# Patient Record
Sex: FEMALE | Race: WHITE | HISPANIC OR LATINO | URBAN - METROPOLITAN AREA
[De-identification: names, ages, dates, MRNs, and addresses within clinical notes are randomized per-mention and may not be internally consistent; named-entity substitution may affect disease eponyms.]

---

## 2018-12-30 ENCOUNTER — WALK IN (OUTPATIENT)
Dept: URGENT CARE | Age: 28
End: 2018-12-30

## 2018-12-30 VITALS
BODY MASS INDEX: 31.86 KG/M2 | DIASTOLIC BLOOD PRESSURE: 70 MMHG | RESPIRATION RATE: 16 BRPM | OXYGEN SATURATION: 97 % | TEMPERATURE: 98.3 F | WEIGHT: 168.76 LBS | SYSTOLIC BLOOD PRESSURE: 108 MMHG | HEIGHT: 61 IN | HEART RATE: 79 BPM

## 2018-12-30 DIAGNOSIS — J02.9 SORE THROAT: ICD-10-CM

## 2018-12-30 DIAGNOSIS — J06.9 VIRAL UPPER RESPIRATORY TRACT INFECTION: Primary | ICD-10-CM

## 2018-12-30 LAB
INTERNAL PROCEDURAL CONTROLS ACCEPTABLE: YES
S PYO AG THROAT QL IA.RAPID: NEGATIVE

## 2018-12-30 PROCEDURE — 87880 STREP A ASSAY W/OPTIC: CPT | Performed by: NURSE PRACTITIONER

## 2018-12-30 PROCEDURE — 99203 OFFICE O/P NEW LOW 30 MIN: CPT | Performed by: NURSE PRACTITIONER

## 2018-12-30 RX ORDER — ALBUTEROL SULFATE 90 UG/1
2 AEROSOL, METERED RESPIRATORY (INHALATION) EVERY 4 HOURS PRN
Qty: 1 INHALER | Refills: 0 | Status: SHIPPED | OUTPATIENT
Start: 2018-12-30

## 2018-12-30 RX ORDER — ALBUTEROL SULFATE 90 UG/1
2 AEROSOL, METERED RESPIRATORY (INHALATION) EVERY 4 HOURS PRN
Qty: 1 INHALER | Refills: 0 | Status: SHIPPED | OUTPATIENT
Start: 2018-12-30 | End: 2018-12-30 | Stop reason: SDUPTHER

## 2018-12-30 ASSESSMENT — ENCOUNTER SYMPTOMS
EYES NEGATIVE: 1
FEVER: 0
SHORTNESS OF BREATH: 0
SINUS PAIN: 0
HEADACHES: 0
SWOLLEN GLANDS: 0
SORE THROAT: 1
SINUS PRESSURE: 0
CONSTITUTIONAL NEGATIVE: 1
CHILLS: 0
ALLERGIC/IMMUNOLOGIC NEGATIVE: 1
WHEEZING: 0
COUGH: 1

## 2019-05-31 ENCOUNTER — LAB ENCOUNTER (OUTPATIENT)
Dept: LAB | Facility: HOSPITAL | Age: 29
End: 2019-05-31
Attending: INTERNAL MEDICINE
Payer: COMMERCIAL

## 2019-05-31 DIAGNOSIS — E05.90 HYPERTHYROIDISM: Primary | ICD-10-CM

## 2019-05-31 PROCEDURE — 83520 IMMUNOASSAY QUANT NOS NONAB: CPT

## 2019-05-31 PROCEDURE — 36415 COLL VENOUS BLD VENIPUNCTURE: CPT

## 2019-05-31 PROCEDURE — 86800 THYROGLOBULIN ANTIBODY: CPT

## 2019-05-31 PROCEDURE — 84480 ASSAY TRIIODOTHYRONINE (T3): CPT

## 2019-05-31 PROCEDURE — 84439 ASSAY OF FREE THYROXINE: CPT

## 2019-05-31 PROCEDURE — 84443 ASSAY THYROID STIM HORMONE: CPT

## 2019-05-31 PROCEDURE — 86376 MICROSOMAL ANTIBODY EACH: CPT

## 2019-06-27 ENCOUNTER — TELEPHONE (OUTPATIENT)
Dept: GASTROENTEROLOGY | Age: 29
End: 2019-06-27

## 2019-08-05 ENCOUNTER — OFFICE VISIT (OUTPATIENT)
Dept: SURGERY | Facility: CLINIC | Age: 29
End: 2019-08-05
Payer: COMMERCIAL

## 2019-08-05 DIAGNOSIS — L72.0 INCLUSION CYST: Primary | ICD-10-CM

## 2019-08-05 DIAGNOSIS — D22.30 NEVUS OF FACE: ICD-10-CM

## 2019-08-05 PROCEDURE — 99243 OFF/OP CNSLTJ NEW/EST LOW 30: CPT | Performed by: PLASTIC SURGERY

## 2019-08-05 RX ORDER — ALBUTEROL SULFATE 90 UG/1
2 AEROSOL, METERED RESPIRATORY (INHALATION)
COMMUNITY
Start: 2018-12-30

## 2019-08-05 NOTE — H&P
Mauri Angulo is a 30year old female that presents with Patient presents with:  Lesion: Upper lip and nose and forehead   .     REFERRED BY:  Jewels Damon MD      Pacemaker: No  Latex Allergy: no  Coumadin: No  Plavix: No  Other anticoagulants: No  Ca Aero Soln Inhale 2 puffs into the lungs.  Disp:  Rfl:        Allergies:     Ibuprofen               ANAPHYLAXIS, SWELLING, ITCHING    Comment:Tongue itchy and swollen  Other                   OTHER (SEE COMMENTS)    Comment:Mother has anaphylaxis to sulfa, Past Sunlamp Treatments for Acne: Not Asked        History of tanning: No        Hx of Spending [address] of Time in Sun: No        Bad sunburns in the past: No        Tanning Salons in the Past: No        Hx of Radiation Treatments: No        Regular

## 2019-08-13 ENCOUNTER — APPOINTMENT (OUTPATIENT)
Dept: LAB | Facility: HOSPITAL | Age: 29
End: 2019-08-13
Attending: INTERNAL MEDICINE
Payer: COMMERCIAL

## 2019-08-13 DIAGNOSIS — E05.90 HYPERTHYROIDISM: ICD-10-CM

## 2019-08-13 PROCEDURE — 86376 MICROSOMAL ANTIBODY EACH: CPT

## 2019-08-13 PROCEDURE — 86800 THYROGLOBULIN ANTIBODY: CPT

## 2019-08-13 PROCEDURE — 36415 COLL VENOUS BLD VENIPUNCTURE: CPT

## 2019-08-14 LAB
THYROGLOB SERPL-MCNC: 289 U/ML (ref ?–60)
THYROPEROXIDASE AB SERPL-ACNC: 4175 U/ML (ref ?–60)

## 2019-10-16 ENCOUNTER — LAB ENCOUNTER (OUTPATIENT)
Dept: LAB | Facility: HOSPITAL | Age: 29
End: 2019-10-16
Attending: INTERNAL MEDICINE
Payer: COMMERCIAL

## 2019-10-16 DIAGNOSIS — E05.90 HYPERTHYROIDISM: Primary | ICD-10-CM

## 2019-10-16 DIAGNOSIS — E05.00 GRAVES DISEASE: ICD-10-CM

## 2019-10-16 PROCEDURE — 84480 ASSAY TRIIODOTHYRONINE (T3): CPT

## 2019-10-16 PROCEDURE — 36415 COLL VENOUS BLD VENIPUNCTURE: CPT

## 2019-10-16 PROCEDURE — 84443 ASSAY THYROID STIM HORMONE: CPT

## 2019-10-16 PROCEDURE — 84439 ASSAY OF FREE THYROXINE: CPT

## 2020-02-13 PROBLEM — F98.8 ADD (ATTENTION DEFICIT DISORDER): Status: ACTIVE | Noted: 2020-02-13

## 2020-02-13 PROBLEM — M79.7 FIBROMYALGIA: Status: ACTIVE | Noted: 2020-02-13

## 2020-02-14 PROBLEM — F41.8 POSTPARTUM ANXIETY: Status: ACTIVE | Noted: 2020-02-14

## 2020-02-14 PROBLEM — F41.8 POSTPARTUM ANXIETY (HCC): Status: ACTIVE | Noted: 2020-02-14

## 2020-02-14 PROBLEM — F50.9 DISORDER OF EATING: Status: ACTIVE | Noted: 2020-02-14

## 2020-04-21 PROBLEM — Z34.90 PREGNANCY: Status: ACTIVE | Noted: 2020-04-21

## 2020-04-21 PROBLEM — Z34.90 PREGNANCY (HCC): Status: ACTIVE | Noted: 2020-04-21

## 2020-05-26 ENCOUNTER — ANESTHESIA (OUTPATIENT)
Dept: SURGERY | Facility: HOSPITAL | Age: 30
End: 2020-05-26
Payer: COMMERCIAL

## 2020-05-26 ENCOUNTER — HOSPITAL ENCOUNTER (OUTPATIENT)
Facility: HOSPITAL | Age: 30
Setting detail: HOSPITAL OUTPATIENT SURGERY
Discharge: HOME | End: 2020-05-26
Attending: OBSTETRICS & GYNECOLOGY | Admitting: OBSTETRICS & GYNECOLOGY
Payer: COMMERCIAL

## 2020-05-26 ENCOUNTER — ANESTHESIA EVENT (OUTPATIENT)
Dept: SURGERY | Facility: HOSPITAL | Age: 30
End: 2020-05-26
Payer: COMMERCIAL

## 2020-05-26 VITALS
OXYGEN SATURATION: 98 % | SYSTOLIC BLOOD PRESSURE: 110 MMHG | WEIGHT: 137 LBS | HEIGHT: 61 IN | BODY MASS INDEX: 25.86 KG/M2 | TEMPERATURE: 98 F | HEART RATE: 67 BPM | DIASTOLIC BLOOD PRESSURE: 57 MMHG | RESPIRATION RATE: 17 BRPM

## 2020-05-26 DIAGNOSIS — O02.1 MISSED ABORTION: ICD-10-CM

## 2020-05-26 PROBLEM — Z34.90 PREGNANCY: Status: RESOLVED | Noted: 2020-04-21 | Resolved: 2020-05-26

## 2020-05-26 PROBLEM — Z34.90 PREGNANCY (HCC): Status: RESOLVED | Noted: 2020-04-21 | Resolved: 2020-05-26

## 2020-05-26 PROCEDURE — 86900 BLOOD TYPING SEROLOGIC ABO: CPT | Performed by: OBSTETRICS & GYNECOLOGY

## 2020-05-26 PROCEDURE — 86850 RBC ANTIBODY SCREEN: CPT | Performed by: OBSTETRICS & GYNECOLOGY

## 2020-05-26 PROCEDURE — 88305 TISSUE EXAM BY PATHOLOGIST: CPT | Performed by: OBSTETRICS & GYNECOLOGY

## 2020-05-26 PROCEDURE — 86901 BLOOD TYPING SEROLOGIC RH(D): CPT | Performed by: OBSTETRICS & GYNECOLOGY

## 2020-05-26 PROCEDURE — 10D17ZZ EXTRACTION OF PRODUCTS OF CONCEPTION, RETAINED, VIA NATURAL OR ARTIFICIAL OPENING: ICD-10-PCS | Performed by: OBSTETRICS & GYNECOLOGY

## 2020-05-26 RX ORDER — FAMOTIDINE 20 MG/1
20 TABLET ORAL ONCE
Status: DISCONTINUED | OUTPATIENT
Start: 2020-05-26 | End: 2020-05-26 | Stop reason: HOSPADM

## 2020-05-26 RX ORDER — HYDROCODONE BITARTRATE AND ACETAMINOPHEN 5; 325 MG/1; MG/1
1 TABLET ORAL AS NEEDED
Status: DISCONTINUED | OUTPATIENT
Start: 2020-05-26 | End: 2020-05-26

## 2020-05-26 RX ORDER — MORPHINE SULFATE 10 MG/ML
6 INJECTION, SOLUTION INTRAMUSCULAR; INTRAVENOUS EVERY 10 MIN PRN
Status: DISCONTINUED | OUTPATIENT
Start: 2020-05-26 | End: 2020-05-26

## 2020-05-26 RX ORDER — ONDANSETRON 2 MG/ML
4 INJECTION INTRAMUSCULAR; INTRAVENOUS EVERY 8 HOURS PRN
Status: CANCELLED | OUTPATIENT
Start: 2020-05-26

## 2020-05-26 RX ORDER — HYDROMORPHONE HYDROCHLORIDE 1 MG/ML
0.2 INJECTION, SOLUTION INTRAMUSCULAR; INTRAVENOUS; SUBCUTANEOUS EVERY 5 MIN PRN
Status: DISCONTINUED | OUTPATIENT
Start: 2020-05-26 | End: 2020-05-26

## 2020-05-26 RX ORDER — HYDROCODONE BITARTRATE AND ACETAMINOPHEN 5; 325 MG/1; MG/1
2 TABLET ORAL AS NEEDED
Status: DISCONTINUED | OUTPATIENT
Start: 2020-05-26 | End: 2020-05-26

## 2020-05-26 RX ORDER — HYDROCODONE BITARTRATE AND ACETAMINOPHEN 5; 325 MG/1; MG/1
1 TABLET ORAL EVERY 6 HOURS PRN
Status: CANCELLED | OUTPATIENT
Start: 2020-05-26

## 2020-05-26 RX ORDER — SODIUM CHLORIDE, SODIUM LACTATE, POTASSIUM CHLORIDE, CALCIUM CHLORIDE 600; 310; 30; 20 MG/100ML; MG/100ML; MG/100ML; MG/100ML
INJECTION, SOLUTION INTRAVENOUS CONTINUOUS
Status: DISCONTINUED | OUTPATIENT
Start: 2020-05-26 | End: 2020-05-26

## 2020-05-26 RX ORDER — IBUPROFEN 600 MG/1
600 TABLET ORAL EVERY 6 HOURS
Status: CANCELLED | OUTPATIENT
Start: 2020-05-26

## 2020-05-26 RX ORDER — HYDROMORPHONE HYDROCHLORIDE 1 MG/ML
0.4 INJECTION, SOLUTION INTRAMUSCULAR; INTRAVENOUS; SUBCUTANEOUS EVERY 5 MIN PRN
Status: DISCONTINUED | OUTPATIENT
Start: 2020-05-26 | End: 2020-05-26

## 2020-05-26 RX ORDER — ONDANSETRON 2 MG/ML
4 INJECTION INTRAMUSCULAR; INTRAVENOUS ONCE AS NEEDED
Status: DISCONTINUED | OUTPATIENT
Start: 2020-05-26 | End: 2020-05-26

## 2020-05-26 RX ORDER — HYDROMORPHONE HYDROCHLORIDE 1 MG/ML
0.6 INJECTION, SOLUTION INTRAMUSCULAR; INTRAVENOUS; SUBCUTANEOUS EVERY 5 MIN PRN
Status: DISCONTINUED | OUTPATIENT
Start: 2020-05-26 | End: 2020-05-26

## 2020-05-26 RX ORDER — MISOPROSTOL 100 UG/1
400 TABLET ORAL ONCE
Status: COMPLETED | OUTPATIENT
Start: 2020-05-26 | End: 2020-05-26

## 2020-05-26 RX ORDER — LIDOCAINE HYDROCHLORIDE 10 MG/ML
INJECTION, SOLUTION EPIDURAL; INFILTRATION; INTRACAUDAL; PERINEURAL AS NEEDED
Status: DISCONTINUED | OUTPATIENT
Start: 2020-05-26 | End: 2020-05-26 | Stop reason: SURG

## 2020-05-26 RX ORDER — ACETAMINOPHEN 500 MG
1000 TABLET ORAL ONCE
Status: COMPLETED | OUTPATIENT
Start: 2020-05-26 | End: 2020-05-26

## 2020-05-26 RX ORDER — ACETAMINOPHEN 325 MG/1
650 TABLET ORAL EVERY 6 HOURS PRN
Status: CANCELLED | OUTPATIENT
Start: 2020-05-26

## 2020-05-26 RX ORDER — PROCHLORPERAZINE EDISYLATE 5 MG/ML
5 INJECTION INTRAMUSCULAR; INTRAVENOUS ONCE AS NEEDED
Status: DISCONTINUED | OUTPATIENT
Start: 2020-05-26 | End: 2020-05-26

## 2020-05-26 RX ORDER — DIPHENHYDRAMINE HYDROCHLORIDE 50 MG/ML
12.5 INJECTION INTRAMUSCULAR; INTRAVENOUS EVERY 4 HOURS PRN
Status: CANCELLED | OUTPATIENT
Start: 2020-05-26

## 2020-05-26 RX ORDER — METOCLOPRAMIDE 10 MG/1
10 TABLET ORAL ONCE
Status: DISCONTINUED | OUTPATIENT
Start: 2020-05-26 | End: 2020-05-26 | Stop reason: HOSPADM

## 2020-05-26 RX ORDER — ONDANSETRON 4 MG/1
4 TABLET, FILM COATED ORAL EVERY 8 HOURS PRN
Status: CANCELLED | OUTPATIENT
Start: 2020-05-26

## 2020-05-26 RX ORDER — MORPHINE SULFATE 4 MG/ML
2 INJECTION, SOLUTION INTRAMUSCULAR; INTRAVENOUS EVERY 10 MIN PRN
Status: DISCONTINUED | OUTPATIENT
Start: 2020-05-26 | End: 2020-05-26

## 2020-05-26 RX ORDER — MORPHINE SULFATE 4 MG/ML
4 INJECTION, SOLUTION INTRAMUSCULAR; INTRAVENOUS EVERY 10 MIN PRN
Status: DISCONTINUED | OUTPATIENT
Start: 2020-05-26 | End: 2020-05-26

## 2020-05-26 RX ORDER — NALOXONE HYDROCHLORIDE 0.4 MG/ML
80 INJECTION, SOLUTION INTRAMUSCULAR; INTRAVENOUS; SUBCUTANEOUS AS NEEDED
Status: DISCONTINUED | OUTPATIENT
Start: 2020-05-26 | End: 2020-05-26

## 2020-05-26 RX ORDER — DOXYCYCLINE HYCLATE 100 MG/1
100 CAPSULE ORAL ONCE
Status: COMPLETED | OUTPATIENT
Start: 2020-05-26 | End: 2020-05-26

## 2020-05-26 RX ORDER — HYDROCODONE BITARTRATE AND ACETAMINOPHEN 5; 325 MG/1; MG/1
2 TABLET ORAL EVERY 6 HOURS PRN
Status: CANCELLED | OUTPATIENT
Start: 2020-05-26

## 2020-05-26 RX ADMIN — SODIUM CHLORIDE, SODIUM LACTATE, POTASSIUM CHLORIDE, CALCIUM CHLORIDE: 600; 310; 30; 20 INJECTION, SOLUTION INTRAVENOUS at 12:03:00

## 2020-05-26 RX ADMIN — LIDOCAINE HYDROCHLORIDE 50 MG: 10 INJECTION, SOLUTION EPIDURAL; INFILTRATION; INTRACAUDAL; PERINEURAL at 11:46:00

## 2020-05-26 NOTE — H&P
[address] is a 26year old female. No LMP recorded. Patient is pregnant.   HPI:    Patient here for spotting early in pregnancy, had ultrasound at other clinic showing IUP with FHT repea itching  MUSCULOSKELETAL: denies back pain  NEURO: denies headaches, denies extremity weakness  PSYCHE: denies depression denies anxiety  HEMATOLOGIC: denies hx of anemia, denies easy bruising or bleeding  ENDOCRINE: denies excessive thirst or urination

## 2020-05-26 NOTE — ANESTHESIA PREPROCEDURE EVALUATION
Anesthesia PreOp Note    HPI:     Kalin Ortiz is a 26year old female who presents for preoperative consultation requested by: Michel Floyd MD    Date of Surgery: 5/26/2020    Procedure(s):  DILATION & CURETTAGE SUCTION  Indication: Missed ab 05/26/20 0913  famoTIDine (PEPCID) tab 20 mg, 20 mg, Oral, Once, Adeli, Welby Gowers, MD  Metoclopramide HCl (REGLAN) tab 10 mg, 10 mg, Oral, Once, Adeli, Welby Gowers, MD    No current [facility]-ordered outpatient medications on file.         Other                   OT the sun: No        Past Sunlamp Treatments for Acne: Not Asked        History of tanning: No        Hx of Spending [address] of Time in Sun: No        Bad sunburns in the past: No        Tanning Salons in the Past: No        Hx of Radiation Treatments: No

## 2020-05-26 NOTE — OPERATIVE REPORT
[facility] HOSP - [facility]     Operative Note    Melissa Zuluaga Patient Status:  Hospital Outpatient Surgery    1990 MRN [id number]   Location [place] [address] Attending Ross Ortega MD   Hosp Day # 0 JUSTIN [place]

## 2020-05-26 NOTE — ANESTHESIA POSTPROCEDURE EVALUATION
Patient: Chelita Anderson    Procedure Summary     Date:  20 Room / Location:  [facility] MAIN OR [address] [place] [address] MAIN OR    Anesthesia Start:  25 Anesthesia Stop:      Procedure:  DILATION & CURETTAGE SUCTION (N/A Vagina ) Diagnosis:       Missed

## 2020-05-26 NOTE — DISCHARGE SUMMARY
[facility]    Discharge Summary    Dede He Patient Status:  Hospital Outpatient Surgery    1990 MRN [id number]   Location [place] [address] Attending Randy Granger MD   Hosp Day # 0 [facility]

## 2020-05-26 NOTE — ANESTHESIA PROCEDURE NOTES
Airway  Date/Time: 5/26/2020 11:32 AM  Urgency: Elective      General Information and Staff    Patient location during procedure: OR  Anesthesiologist: Therese Downs MD  Performed: anesthesiologist     Indications and Patient Condition  Indications for

## 2020-05-29 PROBLEM — Z87.59 MISCARRIAGE WITHIN LAST 12 MONTHS: Status: ACTIVE | Noted: 2020-05-29

## 2020-06-09 ENCOUNTER — TELEPHONE (OUTPATIENT)
Dept: OBGYN UNIT | Facility: HOSPITAL | Age: 30
End: 2020-06-09

## 2020-06-12 PROBLEM — Z63.4 GRIEF AT LOSS OF CHILD: Status: ACTIVE | Noted: 2020-06-12

## 2020-06-12 PROBLEM — F43.21 GRIEF AT LOSS OF CHILD: Status: ACTIVE | Noted: 2020-06-12

## 2020-06-12 PROBLEM — N64.4 NIPPLE PAIN: Status: ACTIVE | Noted: 2020-06-12

## 2020-07-14 PROBLEM — R63.2 BINGE EATING: Status: ACTIVE | Noted: 2020-07-14

## 2020-07-21 ENCOUNTER — OFFICE VISIT (OUTPATIENT)
Dept: NEUROLOGY | Facility: CLINIC | Age: 30
End: 2020-07-21
Payer: COMMERCIAL

## 2020-07-21 VITALS — HEIGHT: 61 IN | BODY MASS INDEX: 25.68 KG/M2 | WEIGHT: 136 LBS

## 2020-07-21 DIAGNOSIS — M25.562 CHRONIC PAIN OF LEFT KNEE: ICD-10-CM

## 2020-07-21 DIAGNOSIS — M79.672 LEFT FOOT PAIN: ICD-10-CM

## 2020-07-21 DIAGNOSIS — M22.2X9 PATELLOFEMORAL PAIN SYNDROME, UNSPECIFIED LATERALITY: Primary | ICD-10-CM

## 2020-07-21 DIAGNOSIS — M79.10 MYALGIA: ICD-10-CM

## 2020-07-21 DIAGNOSIS — M62.89 PELVIC FLOOR DYSFUNCTION IN FEMALE: ICD-10-CM

## 2020-07-21 DIAGNOSIS — G89.29 CHRONIC PAIN OF LEFT KNEE: ICD-10-CM

## 2020-07-21 DIAGNOSIS — M54.6 ACUTE MIDLINE THORACIC BACK PAIN: ICD-10-CM

## 2020-07-21 DIAGNOSIS — M77.42 METATARSALGIA OF LEFT FOOT: ICD-10-CM

## 2020-07-21 DIAGNOSIS — N39.3 STRESS INCONTINENCE: ICD-10-CM

## 2020-07-21 DIAGNOSIS — S23.9XXA THORACIC SPRAIN: ICD-10-CM

## 2020-07-21 PROCEDURE — 3008F BODY MASS INDEX DOCD: CPT | Performed by: PHYSICAL MEDICINE & REHABILITATION

## 2020-07-21 PROCEDURE — 99204 OFFICE O/P NEW MOD 45 MIN: CPT | Performed by: PHYSICAL MEDICINE & REHABILITATION

## 2020-07-21 NOTE — H&P
[address] H&P    Requesting Physician: Terell López DO    Chief Complaint (Reason for Visit):  Patient presents with:  Knee Pain: Patient presents today c/o of pain in left knee that has been goi Post partum depression        PAST SURGICAL HISTORY:   Past Surgical History:   Procedure Laterality Date   • DILATION & CURETTAGE SUCTION N/A 5/26/2020    Performed by Rj Velez MD at [facility] MAIN OR        FAMILY HISTORY:   Family History   Problem Re (Approximate)   BMI 25.70 kg/m²     Body mass index is 25.7 kg/m². General: No immediate distress  Head: Normocephalic/ Atraumatic  Eyes: Extra-occular movements intact.    Ears: No auricular hematoma or deformities  Mouth: No lesions or ulcerations  H 107 mmol/L Final   • Carbon Dioxide 06/23/2020 23.0  22.0 - 29.0 mmol/L Final   • Calcium 06/23/2020 9.1  8.6 - 10.0 mg/dL Final   • Total Protein 06/23/2020 7.0  6.4 - 8.3 g/dL Final   • Albumin 06/23/2020 4.4  3.5 - 5.2 g/dL Final   • Bilirubin, Total 06 Imaging:    No image results found. ASSESSMENT AND PLAN:  The patient is a pleasant 26year old female who presents with LEFT knee pain and mid back pain.   For her left knee pain I believe she has patellofemoral syndrome although meniscal injury is al

## 2020-07-21 NOTE — PATIENT INSTRUCTIONS
1) Tylenol 500-1000 mg every 6-8 hours as needed for pain. No more than 3000 mg daily. 2) Start forma ltherapy for the back and knees. 3) continue Sertraline    4) Continue with pelvic floor PT    5) Follow up with me in 6-8 weeks.  If no improvemen

## 2020-07-22 ENCOUNTER — TELEPHONE (OUTPATIENT)
Dept: NEUROLOGY | Facility: CLINIC | Age: 30
End: 2020-07-22

## 2020-07-27 ENCOUNTER — MED REC SCAN ONLY (OUTPATIENT)
Dept: NEUROLOGY | Facility: CLINIC | Age: 30
End: 2020-07-27

## 2020-07-28 PROBLEM — N64.4 NIPPLE PAIN: Status: RESOLVED | Noted: 2020-06-12 | Resolved: 2020-07-28

## 2020-08-13 PROBLEM — F41.1 GENERALIZED ANXIETY DISORDER: Status: ACTIVE | Noted: 2020-08-13

## 2020-08-13 PROBLEM — F33.1 MAJOR DEPRESSIVE DISORDER, RECURRENT, MODERATE (HCC): Status: ACTIVE | Noted: 2020-08-13

## 2020-09-25 ENCOUNTER — LAB ENCOUNTER (OUTPATIENT)
Dept: LAB | Facility: HOSPITAL | Age: 30
End: 2020-09-25
Attending: INTERNAL MEDICINE
Payer: COMMERCIAL

## 2020-09-25 DIAGNOSIS — E05.90 HYPERTHYROIDISM: ICD-10-CM

## 2020-09-25 DIAGNOSIS — E05.00 GRAVES DISEASE: Primary | ICD-10-CM

## 2020-09-25 LAB
T3 SERPL-MCNC: 73 NG/DL (ref 60–181)
T4 FREE SERPL-MCNC: 1 NG/DL (ref 0.8–1.7)
THYROGLOB SERPL-MCNC: 170 U/ML (ref ?–60)
THYROPEROXIDASE AB SERPL-ACNC: 561 U/ML (ref ?–60)
TSI SER-ACNC: 0.59 MIU/ML (ref 0.36–3.74)

## 2020-09-25 PROCEDURE — 84480 ASSAY TRIIODOTHYRONINE (T3): CPT

## 2020-09-25 PROCEDURE — 36415 COLL VENOUS BLD VENIPUNCTURE: CPT

## 2020-09-25 PROCEDURE — 84439 ASSAY OF FREE THYROXINE: CPT

## 2020-09-25 PROCEDURE — 86376 MICROSOMAL ANTIBODY EACH: CPT

## 2020-09-25 PROCEDURE — 84443 ASSAY THYROID STIM HORMONE: CPT

## 2020-09-25 PROCEDURE — 86800 THYROGLOBULIN ANTIBODY: CPT

## 2020-11-03 ENCOUNTER — TELEPHONE (OUTPATIENT)
Dept: NEUROLOGY | Facility: CLINIC | Age: 30
End: 2020-11-03

## 2020-11-03 ENCOUNTER — OFFICE VISIT (OUTPATIENT)
Dept: NEUROLOGY | Facility: CLINIC | Age: 30
End: 2020-11-03

## 2020-11-03 DIAGNOSIS — M79.10 MYALGIA: ICD-10-CM

## 2020-11-03 DIAGNOSIS — M54.6 ACUTE MIDLINE THORACIC BACK PAIN: ICD-10-CM

## 2020-11-03 DIAGNOSIS — M77.42 METATARSALGIA OF LEFT FOOT: ICD-10-CM

## 2020-11-03 DIAGNOSIS — M22.2X9 PATELLOFEMORAL PAIN SYNDROME, UNSPECIFIED LATERALITY: Primary | ICD-10-CM

## 2020-11-03 DIAGNOSIS — M62.89 PELVIC FLOOR DYSFUNCTION IN FEMALE: ICD-10-CM

## 2020-11-03 DIAGNOSIS — M25.562 CHRONIC PAIN OF LEFT KNEE: ICD-10-CM

## 2020-11-03 DIAGNOSIS — G89.29 CHRONIC PAIN OF LEFT KNEE: ICD-10-CM

## 2020-11-03 DIAGNOSIS — M79.672 LEFT FOOT PAIN: ICD-10-CM

## 2020-11-03 DIAGNOSIS — N39.3 STRESS INCONTINENCE: ICD-10-CM

## 2020-11-03 DIAGNOSIS — S23.9XXA THORACIC SPRAIN: ICD-10-CM

## 2020-11-03 PROCEDURE — 99214 OFFICE O/P EST MOD 30 MIN: CPT | Performed by: PHYSICAL MEDICINE & REHABILITATION

## 2020-11-03 PROCEDURE — 99072 ADDL SUPL MATRL&STAF TM PHE: CPT | Performed by: PHYSICAL MEDICINE & REHABILITATION

## 2020-11-03 NOTE — TELEPHONE ENCOUNTER
[employer] for authorization of approval for MRI KNEE, LEFT wo cpt code 68931 Approval was given with Authorization # [id number] effective 11/03/20 to 12/02/20. Will call Pt. To inform. L/m advising of approval. Can proceed with scheduling appt.

## 2020-11-20 ENCOUNTER — HOSPITAL ENCOUNTER (OUTPATIENT)
Dept: MRI IMAGING | Age: 30
Discharge: HOME | End: 2020-11-20
Attending: PHYSICAL MEDICINE & REHABILITATION
Payer: COMMERCIAL

## 2020-11-20 DIAGNOSIS — M79.10 MYALGIA: ICD-10-CM

## 2020-11-20 DIAGNOSIS — M22.2X9 PATELLOFEMORAL PAIN SYNDROME, UNSPECIFIED LATERALITY: ICD-10-CM

## 2020-11-20 DIAGNOSIS — M79.672 LEFT FOOT PAIN: ICD-10-CM

## 2020-11-20 DIAGNOSIS — G89.29 CHRONIC PAIN OF LEFT KNEE: ICD-10-CM

## 2020-11-20 DIAGNOSIS — M54.6 ACUTE MIDLINE THORACIC BACK PAIN: ICD-10-CM

## 2020-11-20 DIAGNOSIS — S23.9XXA THORACIC SPRAIN: ICD-10-CM

## 2020-11-20 DIAGNOSIS — M62.89 PELVIC FLOOR DYSFUNCTION IN FEMALE: ICD-10-CM

## 2020-11-20 DIAGNOSIS — M77.42 METATARSALGIA OF LEFT FOOT: ICD-10-CM

## 2020-11-20 DIAGNOSIS — N39.3 STRESS INCONTINENCE: ICD-10-CM

## 2020-11-20 DIAGNOSIS — M25.562 CHRONIC PAIN OF LEFT KNEE: ICD-10-CM

## 2020-11-20 PROCEDURE — 73721 MRI JNT OF LWR EXTRE W/O DYE: CPT | Performed by: PHYSICAL MEDICINE & REHABILITATION

## 2020-11-30 ENCOUNTER — OFFICE VISIT (OUTPATIENT)
Dept: GASTROENTEROLOGY | Facility: CLINIC | Age: 30
End: 2020-11-30
Payer: COMMERCIAL

## 2020-11-30 VITALS
HEIGHT: 61 IN | DIASTOLIC BLOOD PRESSURE: 77 MMHG | BODY MASS INDEX: 27.56 KG/M2 | SYSTOLIC BLOOD PRESSURE: 121 MMHG | HEART RATE: 76 BPM | WEIGHT: 146 LBS

## 2020-11-30 DIAGNOSIS — M62.89 PELVIC FLOOR DYSFUNCTION: Primary | ICD-10-CM

## 2020-11-30 PROCEDURE — 99203 OFFICE O/P NEW LOW 30 MIN: CPT | Performed by: INTERNAL MEDICINE

## 2020-11-30 PROCEDURE — 3074F SYST BP LT 130 MM HG: CPT | Performed by: INTERNAL MEDICINE

## 2020-11-30 PROCEDURE — 3008F BODY MASS INDEX DOCD: CPT | Performed by: INTERNAL MEDICINE

## 2020-11-30 PROCEDURE — 3078F DIAST BP <80 MM HG: CPT | Performed by: INTERNAL MEDICINE

## 2020-12-01 NOTE — PROGRESS NOTES
HPI:    Patient ID: Jw Loredo is a 26year old female. HPI  The patient is self referred on the advice of her pelvic floor therapist to discuss the possibility of rectal symptoms contributing to vaginal symptoms.     The patient has a longsta Minerals (CALCIUM-MAGNESIUM-ZINC) Oral Tab Take by mouth 3 (three) times daily. 0   • Cetirizine HCl 5 MG Oral Tab Take 5 mg by mouth daily. • Omega-3 1000 MG Oral Cap 1 capsule daily. • PreNata 29-1 MG Oral Chew Tab 1 tablet daily.        • Drake encounter diagnosis)  The patient has a longstanding history of passing large stools with resultant anorectal trauma. She is currently being treated for vaginal prolapse.   Her physical examination today shows no sign of significant stool retention in the

## 2020-12-08 ENCOUNTER — TELEPHONE (OUTPATIENT)
Dept: NEUROLOGY | Facility: CLINIC | Age: 30
End: 2020-12-08

## 2020-12-08 ENCOUNTER — OFFICE VISIT (OUTPATIENT)
Dept: NEUROLOGY | Facility: CLINIC | Age: 30
End: 2020-12-08
Payer: COMMERCIAL

## 2020-12-08 VITALS — HEIGHT: 61 IN | BODY MASS INDEX: 27.56 KG/M2 | WEIGHT: 146 LBS

## 2020-12-08 DIAGNOSIS — M23.322 DERANGEMENT OF POSTERIOR HORN OF MEDIAL MENISCUS OF LEFT KNEE: ICD-10-CM

## 2020-12-08 DIAGNOSIS — M17.12 PRIMARY OSTEOARTHRITIS OF LEFT KNEE: Primary | ICD-10-CM

## 2020-12-08 PROCEDURE — 3008F BODY MASS INDEX DOCD: CPT | Performed by: PHYSICAL MEDICINE & REHABILITATION

## 2020-12-08 PROCEDURE — 99214 OFFICE O/P EST MOD 30 MIN: CPT | Performed by: PHYSICAL MEDICINE & REHABILITATION

## 2020-12-08 NOTE — TELEPHONE ENCOUNTER
Alvaro Connolly at [address] [place] [address] Case/Reference #[id number] to initiate authorization for LEFT knee HA(Synvisc One) injections under ultrasound guidance CPT N6976A and 23601 dx:M17.12 to be done in office.   [place] states no authorization is req

## 2020-12-08 NOTE — PROGRESS NOTES
[address]  Progress Note    CHIEF COMPLAINT:  Patient presents with:  Knee Pain: Pt is presnt following up on her MRI she had on her knee       History of Present Illness:   The patient is a 26year old [address] [place] [address] Multiple Minerals (CALCIUM-MAGNESIUM-ZINC) Oral Tab Take by mouth 3 (three) times daily. 0   • clotrimazole-betamethasone 1-0.05 % External Cream To AA TID 45 g 0   • Cetirizine HCl 5 MG Oral Tab Take 5 mg by mouth daily.      • Omega-3 1000 MG Oral Cap 1 1.7 ng/dL Final   • T3 Total 09/25/2020 73  60 - 181 ng/dL Final   • Anti-Thyroperoxidase 09/25/2020 561* <60 U/mL Final   • Anti-Thyroglobulin 09/25/2020 170* <60 U/mL Final   Orders Only on 06/23/2020   Component Date Value Ref Range Status   • Patient F and body of the medial meniscus. There is a radial component seen within the body as well as an oblique component seen within the body and posterior horn. There is medial extrusion of   the body of the medial meniscus by approximately 4-5 mm.      LATERAL Taz Serna is actively trying to get pregnant, we have decided to start with a hyaluronic acid injection and see if there is any improvement in regeneration of the tissue.   If this is not beneficial, then Taz Serna will decide between a PRP injection versus a s

## 2020-12-08 NOTE — PATIENT INSTRUCTIONS
My office will call you to schedule the LEFT knee HA under ultrasound guidance once the procedure is approved by your insurance carrier.

## 2020-12-10 NOTE — TELEPHONE ENCOUNTER
Called patient to inform. Asked patient to call the day prior to her appointment to verify the medication is in the building.     Understanding verbalized

## 2020-12-14 ENCOUNTER — MED REC SCAN ONLY (OUTPATIENT)
Dept: NEUROLOGY | Facility: CLINIC | Age: 30
End: 2020-12-14

## 2020-12-15 ENCOUNTER — TELEPHONE (OUTPATIENT)
Dept: NEUROLOGY | Facility: CLINIC | Age: 30
End: 2020-12-15

## 2020-12-15 DIAGNOSIS — M79.641 BILATERAL HAND PAIN: Primary | ICD-10-CM

## 2020-12-15 DIAGNOSIS — M79.642 BILATERAL HAND PAIN: Primary | ICD-10-CM

## 2020-12-15 NOTE — TELEPHONE ENCOUNTER
Spoke with Ellen Daniels as she was scheduled for a left knee hyaluronic acid injection tomorrow. She found out she was recently pregnant.   I have called the  of Synvisc 1 and they mention there is no concrete evidence to state whether the hyaluroni

## 2020-12-17 ENCOUNTER — LAB ENCOUNTER (OUTPATIENT)
Dept: LAB | Facility: HOSPITAL | Age: 30
End: 2020-12-17
Attending: INTERNAL MEDICINE
Payer: COMMERCIAL

## 2020-12-17 DIAGNOSIS — U07.1 DIARRHEA DUE TO COVID-19: ICD-10-CM

## 2020-12-17 DIAGNOSIS — A08.39 DIARRHEA DUE TO COVID-19: ICD-10-CM

## 2020-12-17 DIAGNOSIS — Z20.822 CLOSE EXPOSURE TO COVID-19 VIRUS: ICD-10-CM

## 2020-12-31 ENCOUNTER — OFFICE VISIT (OUTPATIENT)
Dept: OBGYN CLINIC | Facility: CLINIC | Age: 30
End: 2020-12-31
Payer: COMMERCIAL

## 2020-12-31 ENCOUNTER — TELEPHONE (OUTPATIENT)
Dept: OBGYN CLINIC | Facility: CLINIC | Age: 30
End: 2020-12-31

## 2020-12-31 ENCOUNTER — LAB ENCOUNTER (OUTPATIENT)
Dept: LAB | Facility: HOSPITAL | Age: 30
End: 2020-12-31
Attending: INTERNAL MEDICINE
Payer: COMMERCIAL

## 2020-12-31 VITALS
HEART RATE: 72 BPM | DIASTOLIC BLOOD PRESSURE: 74 MMHG | BODY MASS INDEX: 28.7 KG/M2 | WEIGHT: 152 LBS | HEIGHT: 61 IN | SYSTOLIC BLOOD PRESSURE: 112 MMHG

## 2020-12-31 DIAGNOSIS — E05.90 HYPERTHYROIDISM: Primary | ICD-10-CM

## 2020-12-31 DIAGNOSIS — Z87.59 HISTORY OF MISCARRIAGE: ICD-10-CM

## 2020-12-31 DIAGNOSIS — R42 DIZZINESS: ICD-10-CM

## 2020-12-31 DIAGNOSIS — N92.6 MISSED MENSES: Primary | ICD-10-CM

## 2020-12-31 LAB
ALBUMIN SERPL-MCNC: 3.6 G/DL (ref 3.4–5)
ALBUMIN/GLOB SERPL: 1.2 {RATIO} (ref 1–2)
ALP LIVER SERPL-CCNC: 49 U/L
ALT SERPL-CCNC: 23 U/L
ANION GAP SERPL CALC-SCNC: 6 MMOL/L (ref 0–18)
AST SERPL-CCNC: 21 U/L (ref 15–37)
B-HCG SERPL-ACNC: ABNORMAL MIU/ML
BILIRUB SERPL-MCNC: 0.3 MG/DL (ref 0.1–2)
BUN BLD-MCNC: 13 MG/DL (ref 7–18)
BUN/CREAT SERPL: 20.6 (ref 10–20)
CALCIUM BLD-MCNC: 8.6 MG/DL (ref 8.5–10.1)
CHLORIDE SERPL-SCNC: 107 MMOL/L (ref 98–112)
CO2 SERPL-SCNC: 24 MMOL/L (ref 21–32)
CREAT BLD-MCNC: 0.63 MG/DL
DEPRECATED RDW RBC AUTO: 40.7 FL (ref 35.1–46.3)
ERYTHROCYTE [DISTWIDTH] IN BLOOD BY AUTOMATED COUNT: 12.3 % (ref 11–15)
GLOBULIN PLAS-MCNC: 3.1 G/DL (ref 2.8–4.4)
GLUCOSE BLD-MCNC: 86 MG/DL (ref 70–99)
HCT VFR BLD AUTO: 41 %
HGB BLD-MCNC: 14 G/DL
M PROTEIN MFR SERPL ELPH: 6.7 G/DL (ref 6.4–8.2)
MCH RBC QN AUTO: 30.6 PG (ref 26–34)
MCHC RBC AUTO-ENTMCNC: 34.1 G/DL (ref 31–37)
MCV RBC AUTO: 89.5 FL
OSMOLALITY SERPL CALC.SUM OF ELEC: 283 MOSM/KG (ref 275–295)
PATIENT FASTING Y/N/NP: NO
PLATELET # BLD AUTO: 294 10(3)UL (ref 150–450)
POTASSIUM SERPL-SCNC: 4.3 MMOL/L (ref 3.5–5.1)
PROGEST SERPL-MCNC: 25 NG/ML
RBC # BLD AUTO: 4.58 X10(6)UL
SODIUM SERPL-SCNC: 137 MMOL/L (ref 136–145)
T3FREE SERPL-MCNC: 2.53 PG/ML (ref 2.4–4.2)
T4 FREE SERPL-MCNC: 1 NG/DL (ref 0.8–1.7)
TSI SER-ACNC: 0.72 MIU/ML (ref 0.36–3.74)
WBC # BLD AUTO: 10.7 X10(3) UL (ref 4–11)

## 2020-12-31 PROCEDURE — 84144 ASSAY OF PROGESTERONE: CPT

## 2020-12-31 PROCEDURE — 80053 COMPREHEN METABOLIC PANEL: CPT

## 2020-12-31 PROCEDURE — 81025 URINE PREGNANCY TEST: CPT | Performed by: ADVANCED PRACTICE MIDWIFE

## 2020-12-31 PROCEDURE — 85027 COMPLETE CBC AUTOMATED: CPT

## 2020-12-31 PROCEDURE — 3008F BODY MASS INDEX DOCD: CPT | Performed by: ADVANCED PRACTICE MIDWIFE

## 2020-12-31 PROCEDURE — 99202 OFFICE O/P NEW SF 15 MIN: CPT | Performed by: ADVANCED PRACTICE MIDWIFE

## 2020-12-31 PROCEDURE — 3078F DIAST BP <80 MM HG: CPT | Performed by: ADVANCED PRACTICE MIDWIFE

## 2020-12-31 PROCEDURE — 3074F SYST BP LT 130 MM HG: CPT | Performed by: ADVANCED PRACTICE MIDWIFE

## 2020-12-31 PROCEDURE — 36415 COLL VENOUS BLD VENIPUNCTURE: CPT

## 2020-12-31 PROCEDURE — 84481 FREE ASSAY (FT-3): CPT

## 2020-12-31 PROCEDURE — 84443 ASSAY THYROID STIM HORMONE: CPT

## 2020-12-31 PROCEDURE — 84439 ASSAY OF FREE THYROXINE: CPT

## 2020-12-31 PROCEDURE — 84702 CHORIONIC GONADOTROPIN TEST: CPT

## 2020-12-31 NOTE — PROGRESS NOTES
Federico Alvarez is a 26year old female. HPI:   Patient presents with:  Gyn Exam: missed menses        ,  of 25 mn old with OB at [facility]. Long labor, baby had Mec was 41 1/2 wks. Miscarriage in May.    Sure LMP 11/15/2020, LUCIO 20 Take 1 tablet (100 mg total) by mouth daily. 90 tablet 0   • Choline 65 MG Oral Tab Take by mouth.  0   • Multiple Minerals (CALCIUM-MAGNESIUM-ZINC) Oral Tab Take by mouth 3 (three) times daily. 0   • Cetirizine HCl 5 MG Oral Tab Take 5 mg by mouth daily. Future    Dizziness  -     CBC, PLATELET; NO DIFFERENTIAL; Future  -     COMP METABOLIC PANEL (14); Future               1.  Continue PNV with DHA. CNM care & philosophies reviewed. Discussed measures to help with nausea as well as dizziness.  CBC & CMP ord Dorathy Gone is similar to the mechanism used in the Ebola Vaccine which as been administered during pregnancy with a good safety profile (Jules et al. Eugene Nichols Infect Dis 2020;26(3):541).  We discussed that recommendations may shift as data

## 2021-01-02 ENCOUNTER — LAB ENCOUNTER (OUTPATIENT)
Dept: LAB | Facility: HOSPITAL | Age: 31
End: 2021-01-02
Attending: ADVANCED PRACTICE MIDWIFE
Payer: COMMERCIAL

## 2021-01-02 DIAGNOSIS — Z87.59 HISTORY OF MISCARRIAGE: ICD-10-CM

## 2021-01-02 DIAGNOSIS — N92.6 MISSED MENSES: ICD-10-CM

## 2021-01-02 LAB — B-HCG SERPL-ACNC: ABNORMAL MIU/ML

## 2021-01-02 PROCEDURE — 36415 COLL VENOUS BLD VENIPUNCTURE: CPT

## 2021-01-02 PROCEDURE — 84702 CHORIONIC GONADOTROPIN TEST: CPT

## 2021-01-04 ENCOUNTER — TELEPHONE (OUTPATIENT)
Dept: OBGYN CLINIC | Facility: CLINIC | Age: 31
End: 2021-01-04

## 2021-01-04 NOTE — TELEPHONE ENCOUNTER
----- Message from Erin Alan CNM sent at 1/4/2021 10:11 AM CST -----  Please notify that hcg increased but did not double. Though we expect doubling in normal pregnancy, not all pregnancies have. Doubling.  Her progesterone level was normal which

## 2021-01-04 NOTE — TELEPHONE ENCOUNTER
Pt was advised of test results and MJ's recs. Pt has OB US scheduled for 1/8/21. Pt will keep US appt. Pt agrees with plan.

## 2021-02-11 PROBLEM — F33.42 RECURRENT MAJOR DEPRESSIVE DISORDER, IN FULL REMISSION (HCC): Status: ACTIVE | Noted: 2020-08-13

## 2021-02-11 PROBLEM — Z34.91 NORMAL PREGNANCY IN FIRST TRIMESTER (HCC): Status: ACTIVE | Noted: 2020-04-21

## 2021-02-11 PROBLEM — Z34.91 NORMAL PREGNANCY IN FIRST TRIMESTER: Status: ACTIVE | Noted: 2020-04-21

## 2021-02-17 ENCOUNTER — LAB ENCOUNTER (OUTPATIENT)
Dept: LAB | Facility: HOSPITAL | Age: 31
End: 2021-02-17
Attending: INTERNAL MEDICINE
Payer: COMMERCIAL

## 2021-02-17 DIAGNOSIS — E05.90 HYPERTHYROIDISM: Primary | ICD-10-CM

## 2021-02-17 LAB
T3 SERPL-MCNC: 102 NG/DL (ref 60–181)
T4 FREE SERPL-MCNC: 0.9 NG/DL (ref 0.8–1.7)
TSI SER-ACNC: 0.36 MIU/ML (ref 0.36–3.74)

## 2021-02-17 PROCEDURE — 84443 ASSAY THYROID STIM HORMONE: CPT

## 2021-02-17 PROCEDURE — 36415 COLL VENOUS BLD VENIPUNCTURE: CPT

## 2021-02-17 PROCEDURE — 84480 ASSAY TRIIODOTHYRONINE (T3): CPT

## 2021-02-17 PROCEDURE — 84439 ASSAY OF FREE THYROXINE: CPT

## 2021-03-10 ENCOUNTER — LAB ENCOUNTER (OUTPATIENT)
Dept: LAB | Facility: HOSPITAL | Age: 31
End: 2021-03-10
Attending: INTERNAL MEDICINE
Payer: COMMERCIAL

## 2021-03-10 DIAGNOSIS — E05.90 HYPERTHYROIDISM: Primary | ICD-10-CM

## 2021-03-10 LAB
T3 SERPL-MCNC: 140 NG/DL (ref 60–181)
T4 FREE SERPL-MCNC: 0.8 NG/DL (ref 0.8–1.7)
TSI SER-ACNC: 0.63 MIU/ML (ref 0.36–3.74)

## 2021-03-10 PROCEDURE — 36415 COLL VENOUS BLD VENIPUNCTURE: CPT

## 2021-03-10 PROCEDURE — 84480 ASSAY TRIIODOTHYRONINE (T3): CPT

## 2021-03-10 PROCEDURE — 84443 ASSAY THYROID STIM HORMONE: CPT

## 2021-03-10 PROCEDURE — 84439 ASSAY OF FREE THYROXINE: CPT

## 2021-04-02 ENCOUNTER — IMMUNIZATION (OUTPATIENT)
Dept: LAB | Age: 31
End: 2021-04-02
Attending: HOSPITALIST
Payer: COMMERCIAL

## 2021-04-02 DIAGNOSIS — Z23 NEED FOR VACCINATION: Primary | ICD-10-CM

## 2021-04-02 PROCEDURE — 0001A SARSCOV2 VAC 30MCG/0.3ML IM: CPT

## 2021-04-24 ENCOUNTER — IMMUNIZATION (OUTPATIENT)
Dept: LAB | Age: 31
End: 2021-04-24
Attending: HOSPITALIST
Payer: COMMERCIAL

## 2021-04-24 DIAGNOSIS — Z23 NEED FOR VACCINATION: Primary | ICD-10-CM

## 2021-04-24 PROCEDURE — 0002A SARSCOV2 VAC 30MCG/0.3ML IM: CPT

## 2021-05-26 ENCOUNTER — LAB ENCOUNTER (OUTPATIENT)
Dept: LAB | Facility: HOSPITAL | Age: 31
End: 2021-05-26
Attending: OBSTETRICS & GYNECOLOGY
Payer: COMMERCIAL

## 2021-05-26 DIAGNOSIS — Z34.82 ENCOUNTER FOR SUPERVISION OF OTHER NORMAL PREGNANCY IN SECOND TRIMESTER: ICD-10-CM

## 2021-05-26 DIAGNOSIS — Z34.82 PRENATAL CARE, SUBSEQUENT PREGNANCY, SECOND TRIMESTER: Primary | ICD-10-CM

## 2021-05-26 PROCEDURE — 82950 GLUCOSE TEST: CPT

## 2021-05-26 PROCEDURE — 87389 HIV-1 AG W/HIV-1&-2 AB AG IA: CPT

## 2021-05-26 PROCEDURE — 36415 COLL VENOUS BLD VENIPUNCTURE: CPT

## 2021-05-26 PROCEDURE — 85025 COMPLETE CBC W/AUTO DIFF WBC: CPT

## 2021-05-26 PROCEDURE — 86780 TREPONEMA PALLIDUM: CPT

## 2021-05-31 ENCOUNTER — LAB ENCOUNTER (OUTPATIENT)
Dept: LAB | Facility: HOSPITAL | Age: 31
End: 2021-05-31
Attending: OBSTETRICS & GYNECOLOGY
Payer: COMMERCIAL

## 2021-05-31 DIAGNOSIS — R73.09 ELEVATED GLUCOSE TOLERANCE TEST: ICD-10-CM

## 2021-05-31 DIAGNOSIS — Z34.82 ENCOUNTER FOR SUPERVISION OF OTHER NORMAL PREGNANCY IN SECOND TRIMESTER: ICD-10-CM

## 2021-05-31 PROCEDURE — 82952 GTT-ADDED SAMPLES: CPT

## 2021-05-31 PROCEDURE — 84481 FREE ASSAY (FT-3): CPT

## 2021-05-31 PROCEDURE — 36415 COLL VENOUS BLD VENIPUNCTURE: CPT

## 2021-05-31 PROCEDURE — 82951 GLUCOSE TOLERANCE TEST (GTT): CPT

## 2021-05-31 PROCEDURE — 84443 ASSAY THYROID STIM HORMONE: CPT

## 2021-07-07 ENCOUNTER — LAB ENCOUNTER (OUTPATIENT)
Dept: LAB | Facility: HOSPITAL | Age: 31
End: 2021-07-07
Attending: OBSTETRICS & GYNECOLOGY
Payer: COMMERCIAL

## 2021-07-07 DIAGNOSIS — Z34.80 SUPERVISION OF OTHER NORMAL PREGNANCY: ICD-10-CM

## 2021-07-07 LAB
T3FREE SERPL-MCNC: 2.58 PG/ML (ref 2.4–4.2)
T4 FREE SERPL-MCNC: 0.7 NG/DL (ref 0.8–1.7)
TSI SER-ACNC: 0.35 MIU/ML (ref 0.36–3.74)

## 2021-07-07 PROCEDURE — 36415 COLL VENOUS BLD VENIPUNCTURE: CPT

## 2021-07-07 PROCEDURE — 84481 FREE ASSAY (FT-3): CPT

## 2021-07-07 PROCEDURE — 84439 ASSAY OF FREE THYROXINE: CPT

## 2021-07-07 PROCEDURE — 84443 ASSAY THYROID STIM HORMONE: CPT

## 2021-07-24 ENCOUNTER — HOSPITAL ENCOUNTER (OUTPATIENT)
Facility: HOSPITAL | Age: 31
Setting detail: OBSERVATION
Discharge: ANOTHER HOSPITAL | End: 2021-07-24
Attending: OBSTETRICS & GYNECOLOGY | Admitting: OBSTETRICS & GYNECOLOGY
Payer: COMMERCIAL

## 2021-07-24 ENCOUNTER — HOSPITAL ENCOUNTER (EMERGENCY)
Facility: HOSPITAL | Age: 31
Discharge: HOME | End: 2021-07-24
Attending: EMERGENCY MEDICINE
Payer: COMMERCIAL

## 2021-07-24 VITALS
HEART RATE: 100 BPM | DIASTOLIC BLOOD PRESSURE: 65 MMHG | RESPIRATION RATE: 18 BRPM | SYSTOLIC BLOOD PRESSURE: 126 MMHG | TEMPERATURE: 98 F

## 2021-07-24 VITALS
DIASTOLIC BLOOD PRESSURE: 72 MMHG | OXYGEN SATURATION: 98 % | SYSTOLIC BLOOD PRESSURE: 116 MMHG | BODY MASS INDEX: 35.87 KG/M2 | RESPIRATION RATE: 18 BRPM | TEMPERATURE: 98 F | WEIGHT: 190 LBS | HEART RATE: 80 BPM | HEIGHT: 61 IN

## 2021-07-24 DIAGNOSIS — R00.2 PALPITATIONS: Primary | ICD-10-CM

## 2021-07-24 PROBLEM — Z34.90 PREGNANCY: Status: ACTIVE | Noted: 2021-07-24

## 2021-07-24 PROBLEM — Z34.90 PREGNANCY (HCC): Status: ACTIVE | Noted: 2021-07-24

## 2021-07-24 LAB
ALBUMIN SERPL-MCNC: 2.5 G/DL (ref 3.4–5)
ALP LIVER SERPL-CCNC: 101 U/L
ALT SERPL-CCNC: 15 U/L
ANION GAP SERPL CALC-SCNC: 9 MMOL/L (ref 0–18)
AST SERPL-CCNC: 14 U/L (ref 15–37)
BASOPHILS # BLD AUTO: 0.02 X10(3) UL (ref 0–0.2)
BASOPHILS NFR BLD AUTO: 0.2 %
BILIRUB DIRECT SERPL-MCNC: <0.1 MG/DL (ref 0–0.2)
BILIRUB SERPL-MCNC: 0.3 MG/DL (ref 0.1–2)
BUN BLD-MCNC: 9 MG/DL (ref 7–18)
BUN/CREAT SERPL: 22.5 (ref 10–20)
CALCIUM BLD-MCNC: 8.7 MG/DL (ref 8.5–10.1)
CHLORIDE SERPL-SCNC: 108 MMOL/L (ref 98–112)
CO2 SERPL-SCNC: 22 MMOL/L (ref 21–32)
CREAT BLD-MCNC: 0.4 MG/DL
DEPRECATED RDW RBC AUTO: 45.7 FL (ref 35.1–46.3)
EOSINOPHIL # BLD AUTO: 0.19 X10(3) UL (ref 0–0.7)
EOSINOPHIL NFR BLD AUTO: 1.6 %
ERYTHROCYTE [DISTWIDTH] IN BLOOD BY AUTOMATED COUNT: 13.4 % (ref 11–15)
GLUCOSE BLD-MCNC: 83 MG/DL (ref 70–99)
HCT VFR BLD AUTO: 37.4 %
HGB BLD-MCNC: 13.2 G/DL
IMM GRANULOCYTES # BLD AUTO: 0.1 X10(3) UL (ref 0–1)
IMM GRANULOCYTES NFR BLD: 0.8 %
LYMPHOCYTES # BLD AUTO: 2.18 X10(3) UL (ref 1–4)
LYMPHOCYTES NFR BLD AUTO: 18.5 %
M PROTEIN MFR SERPL ELPH: 5.8 G/DL (ref 6.4–8.2)
MCH RBC QN AUTO: 32.8 PG (ref 26–34)
MCHC RBC AUTO-ENTMCNC: 35.3 G/DL (ref 31–37)
MCV RBC AUTO: 92.8 FL
MONOCYTES # BLD AUTO: 0.83 X10(3) UL (ref 0.1–1)
MONOCYTES NFR BLD AUTO: 7.1 %
NEUTROPHILS # BLD AUTO: 8.45 X10 (3) UL (ref 1.5–7.7)
NEUTROPHILS # BLD AUTO: 8.45 X10(3) UL (ref 1.5–7.7)
NEUTROPHILS NFR BLD AUTO: 71.8 %
OSMOLALITY SERPL CALC.SUM OF ELEC: 286 MOSM/KG (ref 275–295)
PLATELET # BLD AUTO: 204 10(3)UL (ref 150–450)
POTASSIUM SERPL-SCNC: 3.9 MMOL/L (ref 3.5–5.1)
RBC # BLD AUTO: 4.03 X10(6)UL
SODIUM SERPL-SCNC: 139 MMOL/L (ref 136–145)
TSI SER-ACNC: 0.4 MIU/ML (ref 0.36–3.74)
WBC # BLD AUTO: 11.8 X10(3) UL (ref 4–11)

## 2021-07-24 PROCEDURE — 85025 COMPLETE CBC W/AUTO DIFF WBC: CPT | Performed by: EMERGENCY MEDICINE

## 2021-07-24 PROCEDURE — 99212 OFFICE O/P EST SF 10 MIN: CPT

## 2021-07-24 PROCEDURE — 36415 COLL VENOUS BLD VENIPUNCTURE: CPT

## 2021-07-24 PROCEDURE — 84443 ASSAY THYROID STIM HORMONE: CPT | Performed by: EMERGENCY MEDICINE

## 2021-07-24 PROCEDURE — 99283 EMERGENCY DEPT VISIT LOW MDM: CPT

## 2021-07-24 PROCEDURE — 59025 FETAL NON-STRESS TEST: CPT

## 2021-07-24 PROCEDURE — 93010 ELECTROCARDIOGRAM REPORT: CPT | Performed by: EMERGENCY MEDICINE

## 2021-07-24 PROCEDURE — 93005 ELECTROCARDIOGRAM TRACING: CPT

## 2021-07-24 PROCEDURE — 80048 BASIC METABOLIC PNL TOTAL CA: CPT | Performed by: EMERGENCY MEDICINE

## 2021-07-24 PROCEDURE — 80076 HEPATIC FUNCTION PANEL: CPT | Performed by: EMERGENCY MEDICINE

## 2021-07-24 NOTE — ED INITIAL ASSESSMENT (HPI)
Pt states she has been experiencing dizziness for months, now worsening. States she also notices her HR is elevated 120's when feeling dizzy. Pt 36 weeks pregnant - was evaluated in [facility] prior to ED.

## 2021-07-24 NOTE — PROGRESS NOTES
Pt is a 33year old female admitted to TR2/TR2-A. Patient presents with:  Non-stress Test: pt. states she has been feeling light headed     Pt is  35w6d intra-uterine pregnancy. History obtained, pt. Oriented to room, staff, and plan of care.

## 2021-08-13 ENCOUNTER — HOSPITAL ENCOUNTER (OUTPATIENT)
Facility: HOSPITAL | Age: 31
Setting detail: OBSERVATION
Discharge: HOME | End: 2021-08-13
Attending: OBSTETRICS & GYNECOLOGY | Admitting: OBSTETRICS & GYNECOLOGY
Payer: COMMERCIAL

## 2021-08-13 VITALS
HEART RATE: 96 BPM | SYSTOLIC BLOOD PRESSURE: 127 MMHG | DIASTOLIC BLOOD PRESSURE: 80 MMHG | RESPIRATION RATE: 14 BRPM | TEMPERATURE: 98 F

## 2021-08-13 PROCEDURE — 99213 OFFICE O/P EST LOW 20 MIN: CPT

## 2021-08-13 PROCEDURE — 59025 FETAL NON-STRESS TEST: CPT

## 2021-08-13 NOTE — TRIAGE
[facility]      Triage Note    Wei Lovell Patient Status:  Observation    1990 MRN [id number]   Location [facility] Attending Tiffany Cardoza MD   [facility] Day # 0 PCP DO Nurys Bonilla with:  R/o Labor: pt states having abd cramping and tightening since 0730 occuring every 10-15m; patient states, \"pain in m,y cervix\"; pt denies any vaginal LOF and states +FM     NST reactive; occasional cxns; SVE 0/0/high; Provider notified and dischar

## 2021-08-13 NOTE — PROGRESS NOTES
Pt is a 33year old female admitted to TR2/TR2-A.    Patient presents with:  R/o Labor: pt states having abd cramping and tightening since 0730 occuring every 10-15m; patient states, \"pain in m,y cervix\"; pt denies any vaginal LOF and states +FM      Pt i

## 2021-08-16 ENCOUNTER — TELEPHONE (OUTPATIENT)
Dept: OBGYN UNIT | Facility: HOSPITAL | Age: 31
End: 2021-08-16

## 2021-08-17 ENCOUNTER — LAB ENCOUNTER (OUTPATIENT)
Dept: LAB | Facility: HOSPITAL | Age: 31
End: 2021-08-17
Attending: OBSTETRICS & GYNECOLOGY
Payer: COMMERCIAL

## 2021-08-17 DIAGNOSIS — Z01.818 PRE-OP TESTING: ICD-10-CM

## 2021-08-17 LAB
ANTIBODY SCREEN: NEGATIVE
BASOPHILS # BLD AUTO: 0.01 X10(3) UL (ref 0–0.2)
BASOPHILS NFR BLD AUTO: 0.1 %
DEPRECATED RDW RBC AUTO: 47.3 FL (ref 35.1–46.3)
EOSINOPHIL # BLD AUTO: 0.18 X10(3) UL (ref 0–0.7)
EOSINOPHIL NFR BLD AUTO: 1.5 %
ERYTHROCYTE [DISTWIDTH] IN BLOOD BY AUTOMATED COUNT: 13.5 % (ref 11–15)
HCT VFR BLD AUTO: 40.9 %
HGB BLD-MCNC: 14.2 G/DL
IMM GRANULOCYTES # BLD AUTO: 0.05 X10(3) UL (ref 0–1)
IMM GRANULOCYTES NFR BLD: 0.4 %
LYMPHOCYTES # BLD AUTO: 2.44 X10(3) UL (ref 1–4)
LYMPHOCYTES NFR BLD AUTO: 19.9 %
MCH RBC QN AUTO: 32.9 PG (ref 26–34)
MCHC RBC AUTO-ENTMCNC: 34.7 G/DL (ref 31–37)
MCV RBC AUTO: 94.9 FL
MONOCYTES # BLD AUTO: 0.78 X10(3) UL (ref 0.1–1)
MONOCYTES NFR BLD AUTO: 6.4 %
NEUTROPHILS # BLD AUTO: 8.8 X10 (3) UL (ref 1.5–7.7)
NEUTROPHILS # BLD AUTO: 8.8 X10(3) UL (ref 1.5–7.7)
NEUTROPHILS NFR BLD AUTO: 71.7 %
PLATELET # BLD AUTO: 223 10(3)UL (ref 150–450)
RBC # BLD AUTO: 4.31 X10(6)UL
RH BLOOD TYPE: POSITIVE
WBC # BLD AUTO: 12.3 X10(3) UL (ref 4–11)

## 2021-08-17 PROCEDURE — 36415 COLL VENOUS BLD VENIPUNCTURE: CPT

## 2021-08-17 PROCEDURE — 86850 RBC ANTIBODY SCREEN: CPT

## 2021-08-17 PROCEDURE — 86900 BLOOD TYPING SEROLOGIC ABO: CPT

## 2021-08-17 PROCEDURE — 86901 BLOOD TYPING SEROLOGIC RH(D): CPT

## 2021-08-17 PROCEDURE — 85025 COMPLETE CBC W/AUTO DIFF WBC: CPT

## 2021-08-18 ENCOUNTER — ANESTHESIA (OUTPATIENT)
Dept: OBGYN UNIT | Facility: HOSPITAL | Age: 31
End: 2021-08-18
Payer: COMMERCIAL

## 2021-08-18 ENCOUNTER — HOSPITAL ENCOUNTER (INPATIENT)
Facility: HOSPITAL | Age: 31
LOS: 3 days | Discharge: HOME | End: 2021-08-21
Attending: OBSTETRICS & GYNECOLOGY | Admitting: OBSTETRICS & GYNECOLOGY
Payer: COMMERCIAL

## 2021-08-18 ENCOUNTER — ANESTHESIA EVENT (OUTPATIENT)
Dept: OBGYN UNIT | Facility: HOSPITAL | Age: 31
End: 2021-08-18
Payer: COMMERCIAL

## 2021-08-18 LAB — SARS-COV-2 RNA RESP QL NAA+PROBE: NOT DETECTED

## 2021-08-18 PROCEDURE — 58611 LIGATE OVIDUCT(S) ADD-ON: CPT | Performed by: OBSTETRICS & GYNECOLOGY

## 2021-08-18 PROCEDURE — 0UB70ZZ EXCISION OF BILATERAL FALLOPIAN TUBES, OPEN APPROACH: ICD-10-PCS | Performed by: OBSTETRICS & GYNECOLOGY

## 2021-08-18 PROCEDURE — 59514 CESAREAN DELIVERY ONLY: CPT | Performed by: OBSTETRICS & GYNECOLOGY

## 2021-08-18 RX ORDER — NALBUPHINE HCL 10 MG/ML
2.5 AMPUL (ML) INJECTION EVERY 4 HOURS PRN
Status: ACTIVE | OUTPATIENT
Start: 2021-08-18 | End: 2021-08-19

## 2021-08-18 RX ORDER — SODIUM CHLORIDE, SODIUM LACTATE, POTASSIUM CHLORIDE, CALCIUM CHLORIDE 600; 310; 30; 20 MG/100ML; MG/100ML; MG/100ML; MG/100ML
125 INJECTION, SOLUTION INTRAVENOUS CONTINUOUS
Status: DISCONTINUED | OUTPATIENT
Start: 2021-08-18 | End: 2021-08-18 | Stop reason: HOSPADM

## 2021-08-18 RX ORDER — GABAPENTIN 300 MG/1
300 CAPSULE ORAL EVERY 8 HOURS PRN
Status: DISCONTINUED | OUTPATIENT
Start: 2021-08-18 | End: 2021-08-21

## 2021-08-18 RX ORDER — DOCUSATE SODIUM 100 MG/1
100 CAPSULE, LIQUID FILLED ORAL
Status: DISCONTINUED | OUTPATIENT
Start: 2021-08-18 | End: 2021-08-21

## 2021-08-18 RX ORDER — ONDANSETRON 2 MG/ML
4 INJECTION INTRAMUSCULAR; INTRAVENOUS EVERY 6 HOURS PRN
Status: DISCONTINUED | OUTPATIENT
Start: 2021-08-18 | End: 2021-08-18 | Stop reason: HOSPADM

## 2021-08-18 RX ORDER — NALOXONE HYDROCHLORIDE 0.4 MG/ML
0.08 INJECTION, SOLUTION INTRAMUSCULAR; INTRAVENOUS; SUBCUTANEOUS
Status: ACTIVE | OUTPATIENT
Start: 2021-08-18 | End: 2021-08-19

## 2021-08-18 RX ORDER — METOCLOPRAMIDE 10 MG/1
10 TABLET ORAL ONCE
Status: COMPLETED | OUTPATIENT
Start: 2021-08-18 | End: 2021-08-18

## 2021-08-18 RX ORDER — KETOROLAC TROMETHAMINE 30 MG/ML
INJECTION, SOLUTION INTRAMUSCULAR; INTRAVENOUS
Status: COMPLETED
Start: 2021-08-18 | End: 2021-08-18

## 2021-08-18 RX ORDER — ONDANSETRON 2 MG/ML
4 INJECTION INTRAMUSCULAR; INTRAVENOUS ONCE AS NEEDED
Status: DISPENSED | OUTPATIENT
Start: 2021-08-18 | End: 2021-08-18

## 2021-08-18 RX ORDER — DEXTROSE, SODIUM CHLORIDE, SODIUM LACTATE, POTASSIUM CHLORIDE, AND CALCIUM CHLORIDE 5; .6; .31; .03; .02 G/100ML; G/100ML; G/100ML; G/100ML; G/100ML
INJECTION, SOLUTION INTRAVENOUS CONTINUOUS
Status: DISCONTINUED | OUTPATIENT
Start: 2021-08-18 | End: 2021-08-21

## 2021-08-18 RX ORDER — ACETAMINOPHEN 325 MG/1
650 TABLET ORAL EVERY 6 HOURS PRN
Status: ACTIVE | OUTPATIENT
Start: 2021-08-18 | End: 2021-08-19

## 2021-08-18 RX ORDER — SERTRALINE HYDROCHLORIDE 25 MG/1
25 TABLET, FILM COATED ORAL DAILY
Status: DISCONTINUED | OUTPATIENT
Start: 2021-08-18 | End: 2021-08-18

## 2021-08-18 RX ORDER — FAMOTIDINE 10 MG/ML
20 INJECTION, SOLUTION INTRAVENOUS ONCE
Status: COMPLETED | OUTPATIENT
Start: 2021-08-18 | End: 2021-08-18

## 2021-08-18 RX ORDER — POLYETHYLENE GLYCOL 3350 17 G/17G
17 POWDER, FOR SOLUTION ORAL DAILY PRN
Status: DISCONTINUED | OUTPATIENT
Start: 2021-08-18 | End: 2021-08-21

## 2021-08-18 RX ORDER — OXYCODONE HYDROCHLORIDE 5 MG/1
5 TABLET ORAL EVERY 6 HOURS PRN
Status: DISCONTINUED | OUTPATIENT
Start: 2021-08-18 | End: 2021-08-21

## 2021-08-18 RX ORDER — HYDROCODONE BITARTRATE AND ACETAMINOPHEN 7.5; 325 MG/1; MG/1
2 TABLET ORAL EVERY 6 HOURS PRN
Status: ACTIVE | OUTPATIENT
Start: 2021-08-18 | End: 2021-08-19

## 2021-08-18 RX ORDER — HYDROMORPHONE HYDROCHLORIDE 1 MG/ML
0.6 INJECTION, SOLUTION INTRAMUSCULAR; INTRAVENOUS; SUBCUTANEOUS
Status: ACTIVE | OUTPATIENT
Start: 2021-08-18 | End: 2021-08-19

## 2021-08-18 RX ORDER — DIPHENHYDRAMINE HYDROCHLORIDE 50 MG/ML
12.5 INJECTION INTRAMUSCULAR; INTRAVENOUS EVERY 4 HOURS PRN
Status: ACTIVE | OUTPATIENT
Start: 2021-08-18 | End: 2021-08-19

## 2021-08-18 RX ORDER — PROCHLORPERAZINE EDISYLATE 5 MG/ML
5 INJECTION INTRAMUSCULAR; INTRAVENOUS ONCE AS NEEDED
Status: COMPLETED | OUTPATIENT
Start: 2021-08-18 | End: 2021-08-18

## 2021-08-18 RX ORDER — DIPHENHYDRAMINE HCL 25 MG
25 CAPSULE ORAL EVERY 4 HOURS PRN
Status: ACTIVE | OUTPATIENT
Start: 2021-08-18 | End: 2021-08-19

## 2021-08-18 RX ORDER — FAMOTIDINE 20 MG/1
20 TABLET ORAL ONCE
Status: COMPLETED | OUTPATIENT
Start: 2021-08-18 | End: 2021-08-18

## 2021-08-18 RX ORDER — SODIUM CHLORIDE, SODIUM LACTATE, POTASSIUM CHLORIDE, CALCIUM CHLORIDE 600; 310; 30; 20 MG/100ML; MG/100ML; MG/100ML; MG/100ML
INJECTION, SOLUTION INTRAVENOUS CONTINUOUS
Status: DISCONTINUED | OUTPATIENT
Start: 2021-08-18 | End: 2021-08-21

## 2021-08-18 RX ORDER — HALOPERIDOL 5 MG/ML
0.5 INJECTION INTRAMUSCULAR ONCE AS NEEDED
Status: ACTIVE | OUTPATIENT
Start: 2021-08-18 | End: 2021-08-18

## 2021-08-18 RX ORDER — SODIUM PHOSPHATE, DIBASIC AND SODIUM PHOSPHATE, MONOBASIC 7; 19 G/133ML; G/133ML
1 ENEMA RECTAL ONCE AS NEEDED
Status: DISCONTINUED | OUTPATIENT
Start: 2021-08-18 | End: 2021-08-21

## 2021-08-18 RX ORDER — PHENYLEPHRINE HCL 10 MG/ML
VIAL (ML) INJECTION AS NEEDED
Status: DISCONTINUED | OUTPATIENT
Start: 2021-08-18 | End: 2021-08-18 | Stop reason: SURG

## 2021-08-18 RX ORDER — CETIRIZINE HYDROCHLORIDE 10 MG/1
10 TABLET ORAL DAILY
Status: DISCONTINUED | OUTPATIENT
Start: 2021-08-18 | End: 2021-08-19

## 2021-08-18 RX ORDER — HYDROMORPHONE HYDROCHLORIDE 1 MG/ML
0.4 INJECTION, SOLUTION INTRAMUSCULAR; INTRAVENOUS; SUBCUTANEOUS
Status: ACTIVE | OUTPATIENT
Start: 2021-08-18 | End: 2021-08-19

## 2021-08-18 RX ORDER — ZOLPIDEM TARTRATE 5 MG/1
5 TABLET ORAL NIGHTLY PRN
Status: DISCONTINUED | OUTPATIENT
Start: 2021-08-18 | End: 2021-08-21

## 2021-08-18 RX ORDER — EPHEDRINE SULFATE 50 MG/ML
INJECTION INTRAVENOUS AS NEEDED
Status: DISCONTINUED | OUTPATIENT
Start: 2021-08-18 | End: 2021-08-18 | Stop reason: SURG

## 2021-08-18 RX ORDER — DOCUSATE SODIUM 100 MG/1
100 CAPSULE, LIQUID FILLED ORAL 2 TIMES DAILY PRN
Status: DISCONTINUED | OUTPATIENT
Start: 2021-08-18 | End: 2021-08-21

## 2021-08-18 RX ORDER — IBUPROFEN 600 MG/1
600 TABLET ORAL EVERY 6 HOURS
Status: DISCONTINUED | OUTPATIENT
Start: 2021-08-19 | End: 2021-08-21

## 2021-08-18 RX ORDER — SCOLOPAMINE TRANSDERMAL SYSTEM 1 MG/1
1 PATCH, EXTENDED RELEASE TRANSDERMAL
Status: DISCONTINUED | OUTPATIENT
Start: 2021-08-18 | End: 2021-08-21

## 2021-08-18 RX ORDER — DEXAMETHASONE SODIUM PHOSPHATE 4 MG/ML
VIAL (ML) INJECTION AS NEEDED
Status: DISCONTINUED | OUTPATIENT
Start: 2021-08-18 | End: 2021-08-18 | Stop reason: SURG

## 2021-08-18 RX ORDER — DIPHENHYDRAMINE HYDROCHLORIDE 50 MG/ML
25 INJECTION INTRAMUSCULAR; INTRAVENOUS ONCE AS NEEDED
Status: ACTIVE | OUTPATIENT
Start: 2021-08-18 | End: 2021-08-18

## 2021-08-18 RX ORDER — ONDANSETRON 2 MG/ML
4 INJECTION INTRAMUSCULAR; INTRAVENOUS ONCE AS NEEDED
Status: COMPLETED | OUTPATIENT
Start: 2021-08-18 | End: 2021-08-18

## 2021-08-18 RX ORDER — KETOROLAC TROMETHAMINE 30 MG/ML
30 INJECTION, SOLUTION INTRAMUSCULAR; INTRAVENOUS EVERY 6 HOURS
Status: COMPLETED | OUTPATIENT
Start: 2021-08-18 | End: 2021-08-19

## 2021-08-18 RX ORDER — ACETAMINOPHEN 500 MG
1000 TABLET ORAL EVERY 6 HOURS
Status: DISCONTINUED | OUTPATIENT
Start: 2021-08-18 | End: 2021-08-21

## 2021-08-18 RX ORDER — KETOROLAC TROMETHAMINE 30 MG/ML
30 INJECTION, SOLUTION INTRAMUSCULAR; INTRAVENOUS ONCE AS NEEDED
Status: ACTIVE | OUTPATIENT
Start: 2021-08-18 | End: 2021-08-18

## 2021-08-18 RX ORDER — SIMETHICONE 80 MG
80 TABLET,CHEWABLE ORAL 3 TIMES DAILY PRN
Status: DISCONTINUED | OUTPATIENT
Start: 2021-08-18 | End: 2021-08-21

## 2021-08-18 RX ORDER — SERTRALINE HYDROCHLORIDE 100 MG/1
100 TABLET, FILM COATED ORAL DAILY
Status: DISCONTINUED | OUTPATIENT
Start: 2021-08-18 | End: 2021-08-18

## 2021-08-18 RX ORDER — NALBUPHINE HCL 10 MG/ML
2.5 AMPUL (ML) INJECTION
Status: DISCONTINUED | OUTPATIENT
Start: 2021-08-18 | End: 2021-08-18 | Stop reason: HOSPADM

## 2021-08-18 RX ORDER — HYDROCODONE BITARTRATE AND ACETAMINOPHEN 7.5; 325 MG/1; MG/1
1 TABLET ORAL EVERY 6 HOURS PRN
Status: ACTIVE | OUTPATIENT
Start: 2021-08-18 | End: 2021-08-19

## 2021-08-18 RX ORDER — ACETAMINOPHEN 500 MG
1000 TABLET ORAL ONCE
Status: COMPLETED | OUTPATIENT
Start: 2021-08-18 | End: 2021-08-18

## 2021-08-18 RX ORDER — METOCLOPRAMIDE HYDROCHLORIDE 5 MG/ML
10 INJECTION INTRAMUSCULAR; INTRAVENOUS ONCE
Status: COMPLETED | OUTPATIENT
Start: 2021-08-18 | End: 2021-08-18

## 2021-08-18 RX ORDER — CEFAZOLIN SODIUM/WATER 2 G/20 ML
2 SYRINGE (ML) INTRAVENOUS ONCE
Status: COMPLETED | OUTPATIENT
Start: 2021-08-18 | End: 2021-08-18

## 2021-08-18 RX ORDER — MORPHINE SULFATE 1 MG/ML
INJECTION, SOLUTION EPIDURAL; INTRATHECAL; INTRAVENOUS AS NEEDED
Status: DISCONTINUED | OUTPATIENT
Start: 2021-08-18 | End: 2021-08-18 | Stop reason: SURG

## 2021-08-18 RX ORDER — HYDROMORPHONE HYDROCHLORIDE 1 MG/ML
0.3 INJECTION, SOLUTION INTRAMUSCULAR; INTRAVENOUS; SUBCUTANEOUS EVERY 2 HOUR PRN
Status: DISCONTINUED | OUTPATIENT
Start: 2021-08-18 | End: 2021-08-21

## 2021-08-18 RX ORDER — AMMONIA INHALANTS 0.04 G/.3ML
0.3 INHALANT RESPIRATORY (INHALATION) AS NEEDED
Status: DISCONTINUED | OUTPATIENT
Start: 2021-08-18 | End: 2021-08-21

## 2021-08-18 RX ORDER — BISACODYL 10 MG
10 SUPPOSITORY, RECTAL RECTAL
Status: DISCONTINUED | OUTPATIENT
Start: 2021-08-18 | End: 2021-08-21

## 2021-08-18 RX ORDER — ONDANSETRON 2 MG/ML
4 INJECTION INTRAMUSCULAR; INTRAVENOUS EVERY 6 HOURS PRN
Status: DISCONTINUED | OUTPATIENT
Start: 2021-08-18 | End: 2021-08-21

## 2021-08-18 RX ORDER — ALBUTEROL SULFATE 90 UG/1
2 AEROSOL, METERED RESPIRATORY (INHALATION) EVERY 4 HOURS PRN
Status: DISCONTINUED | OUTPATIENT
Start: 2021-08-18 | End: 2021-08-21

## 2021-08-18 RX ORDER — TRISODIUM CITRATE DIHYDRATE AND CITRIC ACID MONOHYDRATE 500; 334 MG/5ML; MG/5ML
30 SOLUTION ORAL ONCE
Status: COMPLETED | OUTPATIENT
Start: 2021-08-18 | End: 2021-08-18

## 2021-08-18 RX ORDER — ONDANSETRON 2 MG/ML
INJECTION INTRAMUSCULAR; INTRAVENOUS AS NEEDED
Status: DISCONTINUED | OUTPATIENT
Start: 2021-08-18 | End: 2021-08-18 | Stop reason: SURG

## 2021-08-18 RX ORDER — LIDOCAINE HYDROCHLORIDE 10 MG/ML
INJECTION, SOLUTION EPIDURAL; INFILTRATION; INTRACAUDAL; PERINEURAL AS NEEDED
Status: DISCONTINUED | OUTPATIENT
Start: 2021-08-18 | End: 2021-08-18 | Stop reason: SURG

## 2021-08-18 RX ORDER — BUPIVACAINE HYDROCHLORIDE 7.5 MG/ML
INJECTION, SOLUTION INTRASPINAL AS NEEDED
Status: DISCONTINUED | OUTPATIENT
Start: 2021-08-18 | End: 2021-08-18 | Stop reason: SURG

## 2021-08-18 RX ADMIN — EPHEDRINE SULFATE 5 MG: 50 INJECTION INTRAVENOUS at 13:23:00

## 2021-08-18 RX ADMIN — PHENYLEPHRINE HCL 100 MCG: 10 MG/ML VIAL (ML) INJECTION at 13:18:00

## 2021-08-18 RX ADMIN — ONDANSETRON 4 MG: 2 INJECTION INTRAMUSCULAR; INTRAVENOUS at 13:20:00

## 2021-08-18 RX ADMIN — LIDOCAINE HYDROCHLORIDE 3 ML: 10 INJECTION, SOLUTION EPIDURAL; INFILTRATION; INTRACAUDAL; PERINEURAL at 13:13:00

## 2021-08-18 RX ADMIN — PHENYLEPHRINE HCL 200 MCG: 10 MG/ML VIAL (ML) INJECTION at 13:21:00

## 2021-08-18 RX ADMIN — BUPIVACAINE HYDROCHLORIDE 1.6 ML: 7.5 INJECTION, SOLUTION INTRASPINAL at 13:15:00

## 2021-08-18 RX ADMIN — PHENYLEPHRINE HCL 100 MCG: 10 MG/ML VIAL (ML) INJECTION at 13:30:00

## 2021-08-18 RX ADMIN — DEXAMETHASONE SODIUM PHOSPHATE 8 MG: 4 MG/ML VIAL (ML) INJECTION at 13:20:00

## 2021-08-18 RX ADMIN — MORPHINE SULFATE 0.2 MG: 1 INJECTION, SOLUTION EPIDURAL; INTRATHECAL; INTRAVENOUS at 13:15:00

## 2021-08-18 RX ADMIN — SODIUM CHLORIDE, SODIUM LACTATE, POTASSIUM CHLORIDE, CALCIUM CHLORIDE: 600; 310; 30; 20 INJECTION, SOLUTION INTRAVENOUS at 14:10:00

## 2021-08-18 RX ADMIN — SODIUM CHLORIDE, SODIUM LACTATE, POTASSIUM CHLORIDE, CALCIUM CHLORIDE: 600; 310; 30; 20 INJECTION, SOLUTION INTRAVENOUS at 13:43:00

## 2021-08-18 RX ADMIN — PHENYLEPHRINE HCL 100 MCG: 10 MG/ML VIAL (ML) INJECTION at 13:16:00

## 2021-08-18 RX ADMIN — EPHEDRINE SULFATE 10 MG: 50 INJECTION INTRAVENOUS at 13:27:00

## 2021-08-18 RX ADMIN — CEFAZOLIN SODIUM/WATER 2 G: 2 G/20 ML SYRINGE (ML) INTRAVENOUS at 13:20:00

## 2021-08-18 RX ADMIN — PHENYLEPHRINE HCL 100 MCG: 10 MG/ML VIAL (ML) INJECTION at 13:54:00

## 2021-08-18 RX ADMIN — PHENYLEPHRINE HCL 100 MCG: 10 MG/ML VIAL (ML) INJECTION at 13:56:00

## 2021-08-18 NOTE — LACTATION NOTE
LACTATION NOTE - MOTHER      Evaluation Type: Inpatient    Problems identified  Problems identified: Knowledge deficit;Milk supply not WNL  Milk supply not WNL: Reduced (potential)    Maternal history  Maternal history: Caesarean section; Anxiety;Depression

## 2021-08-18 NOTE — OPERATIVE REPORT
[facility] HOSP - [facility]     Section Delivery / Operative Note    [facility] Patient Status:  Inpatient    1990 MRN [id number]   [address]  Attending Zander Jordan MD   Hosp Day # 0 PCP K was incised and extended bilaterally with curved Molina scissors. The rectus muscles were  superiorly and inferiorly.   The peritoneal cavity was entered with blunt dissection superiorly and extended inferiorly, with good visualization of bladder at needle counts were correct x 2. The patient tolerated the procedure well and was taken to recovery room in alert & stable fashion.       Ratna Finley MD  8/18/2021  2:21 PM

## 2021-08-18 NOTE — ANESTHESIA PREPROCEDURE EVALUATION
Anesthesia PreOp Note    HPI:     Saundra Arias is a 33year old female who presents for preoperative consultation requested by: Araceli Orlando MD    Date of Surgery: 2021    Procedure(s):   SECTION  BILATERAL SALPINGECTOMY  Indica thyroid 02/14/2019    Hyperthyroid and hypothyroid   • Fibromyalgia    • History of eating disorder    • Hives    • Hyperthyroidism     after first baby    • Hypothyroidism    • Miscarriage within last 12 months    • Nipple pain 6/12/2020   • Paroxysmal SV 08/18/21 1200  acetaminophen (TYLENOL EXTRA STRENGTH) tab 1,000 mg, 1,000 mg, Oral, Once, Adrien Aguilar MD  Sod Citrate-Citric Acid (BICITRA) solution 30 mL, 30 mL, Oral, Once, Adrien Aguilar MD  ondansetron [facility]) injection 4 mg, 4 mg, Intravenou of Spending [place] of Time in [place]: No        Bad sunburns in the past: No        Tanning Salons in the Past: No        Hx of Radiation Treatments: No        Regular use of sun block: Not Asked    Social History Narrative      Not on file    Social Dete 98.2 °F (36.8 °C)   TempSrc:  Oral   Weight: 87.5 kg (193 lb)    Height: 1.549 m (5' 1\")         Anesthesia Evaluation     Patient summary reviewed and Nursing notes reviewed    Airway   Mallampati: II  TM distance: >3 FB  Neck ROM: full  Dental - normal

## 2021-08-18 NOTE — ANESTHESIA POSTPROCEDURE EVALUATION
Patient: Mer Acuna    Procedure Summary     Date: 21 Room / Location: [facility] L+D OR  [facility] L+D OR    Anesthesia Start: 7684 Anesthesia Stop: 141    Procedures:        SECTION WITH BILATERAL SALPINGECTOMY (N/A Abdomen)      Calixto Buck

## 2021-08-18 NOTE — ANESTHESIA PROCEDURE NOTES
Spinal Block  Performed by: Daniel Cee MD  Authorized by: Daniel Cee MD       General Information and Staff    Start Time:  8/18/2021 1:15 PM  End Time:  8/18/2021 1:20 PM  Anesthesiologist:  Daniel Cee MD  Performed by:   Anesthesiologist  P

## 2021-08-18 NOTE — PROGRESS NOTES
Pt is a 33year old female admitted to TR5/TR5-A. Patient presents with:  Scheduled : breech presentation, bilateral salpingectomy      Pt is  39w3d intra-uterine pregnancy. History obtained, consents signed.  Oriented to room, staff, and

## 2021-08-19 LAB
BASOPHILS # BLD AUTO: 0.03 X10(3) UL (ref 0–0.2)
BASOPHILS NFR BLD AUTO: 0.2 %
DEPRECATED RDW RBC AUTO: 48.6 FL (ref 35.1–46.3)
EOSINOPHIL # BLD AUTO: 0.14 X10(3) UL (ref 0–0.7)
EOSINOPHIL NFR BLD AUTO: 0.8 %
ERYTHROCYTE [DISTWIDTH] IN BLOOD BY AUTOMATED COUNT: 13.7 % (ref 11–15)
HCT VFR BLD AUTO: 35.6 %
HGB BLD-MCNC: 12.2 G/DL
IMM GRANULOCYTES # BLD AUTO: 0.1 X10(3) UL (ref 0–1)
IMM GRANULOCYTES NFR BLD: 0.6 %
LYMPHOCYTES # BLD AUTO: 3.13 X10(3) UL (ref 1–4)
LYMPHOCYTES NFR BLD AUTO: 17.2 %
MCH RBC QN AUTO: 33.1 PG (ref 26–34)
MCHC RBC AUTO-ENTMCNC: 34.3 G/DL (ref 31–37)
MCV RBC AUTO: 96.5 FL
MONOCYTES # BLD AUTO: 1.3 X10(3) UL (ref 0.1–1)
MONOCYTES NFR BLD AUTO: 7.2 %
NEUTROPHILS # BLD AUTO: 13.48 X10 (3) UL (ref 1.5–7.7)
NEUTROPHILS # BLD AUTO: 13.48 X10(3) UL (ref 1.5–7.7)
NEUTROPHILS NFR BLD AUTO: 74 %
PLATELET # BLD AUTO: 179 10(3)UL (ref 150–450)
RBC # BLD AUTO: 3.69 X10(6)UL
WBC # BLD AUTO: 18.2 X10(3) UL (ref 4–11)

## 2021-08-19 RX ORDER — CETIRIZINE HYDROCHLORIDE 10 MG/1
10 TABLET ORAL NIGHTLY
Status: DISCONTINUED | OUTPATIENT
Start: 2021-08-19 | End: 2021-08-21

## 2021-08-19 NOTE — PROGRESS NOTES
[facility] - [facility]    OB/GYNE Progress Note      Orlin Yang Patient Status:  Inpatient    1990 MRN [id number]   Location [facility] 3SE Attending Elif Mack MD   [facility] Day # 1 PCP Vanesa Decker,        Assessment/ no      Results:          No results found.     Erin Ding MD  8/19/2021  7:58 AM

## 2021-08-19 NOTE — LACTATION NOTE
This note was copied from a baby's chart. LACTATION NOTE - INFANT    Evaluation Type  Evaluation Type: Inpatient    Problems & Assessment  Problems Diagnosed or Identified: Sleepy; Hx of NICU/SCN admission  Infant Assessment: Hunger cues present  Muscle to

## 2021-08-20 NOTE — ANESTHESIA POST-OP FOLLOW-UP NOTE
[facility]  Anesthesiology Pain Management Progress Note      Patient name: Kyung Allen 33year old female  : 1990  MRN: [id number]    Diagnosis: Acute postop pain    Reason for Consult: Intrathecal duramorph follow up

## 2021-08-20 NOTE — LACTATION NOTE
This note was copied from a baby's chart.   LACTATION NOTE - INFANT    Evaluation Type  Evaluation Type: Inpatient    Problems & Assessment  Problems Diagnosed or Identified: Hx of NICU/SCN admission  Infant Assessment: Hunger cues present;Skin color: pink

## 2021-08-20 NOTE — H&P
Flip Villalta is a 33year old female. Patient's last menstrual period was 11/15/2020 (approximate). HPI:    Presents for pre-op visit. Plan for C/S and bilateral salpingectomy tomorrow.   Pt with breech presentation, confirmed again on ultras TAB0  Ectopic0  Multiple0  Live Births1                         PHYSICAL EXAM:   Blood pressure 123/68, weight 194 lb (88 kg), last menstrual period 11/15/2020, currently breastfeeding.   GENERAL: well developed, well nourished, in no apparent distress  SKI

## 2021-08-20 NOTE — PROGRESS NOTES
[facility] - [facility]    OB/GYNE Progress Note      Claudette Ferreira Patient Status:  Inpatient    1990 MRN [id number]   Location [facility] 3SE Attending Adrien Aguilar MD   [facility] Day # 2 PCP Art [place], DO       Assessment/ 08/19/2021    HGB 12.2 08/19/2021    HCT 35.6 08/19/2021    .0 08/19/2021    CREATSERUM 0.40 (L) 07/24/2021    BUN 9 07/24/2021     07/24/2021    K 3.9 07/24/2021     07/24/2021    CO2 22.0 07/24/2021    GLU 83 07/24/2021    CA 8.7 07/24

## 2021-08-20 NOTE — LACTATION NOTE
LACTATION NOTE - MOTHER      Evaluation Type: Inpatient    Problems identified  Problems identified: Knowledge deficit;Milk supply WNL  Problems Identified Other: Infant in SCN initially and received formula.  Mom is breastfeeding a toddler and has an abund

## 2021-08-21 VITALS
HEART RATE: 75 BPM | TEMPERATURE: 98 F | DIASTOLIC BLOOD PRESSURE: 64 MMHG | SYSTOLIC BLOOD PRESSURE: 105 MMHG | OXYGEN SATURATION: 98 % | BODY MASS INDEX: 36.44 KG/M2 | WEIGHT: 193 LBS | HEIGHT: 61 IN | RESPIRATION RATE: 16 BRPM

## 2021-08-21 RX ORDER — OXYCODONE AND ACETAMINOPHEN 10; 325 MG/1; MG/1
1 TABLET ORAL EVERY 4 HOURS
Status: DISCONTINUED | OUTPATIENT
Start: 2021-08-21 | End: 2021-08-21

## 2021-08-21 RX ORDER — OXYCODONE AND ACETAMINOPHEN 10; 325 MG/1; MG/1
1 TABLET ORAL EVERY 4 HOURS PRN
Qty: 30 TABLET | Refills: 0 | Status: SHIPPED | OUTPATIENT
Start: 2021-08-21 | End: 2021-08-21

## 2021-08-21 RX ORDER — GABAPENTIN 300 MG/1
300 CAPSULE ORAL EVERY 8 HOURS PRN
Qty: 30 CAPSULE | Refills: 0 | Status: SHIPPED | OUTPATIENT
Start: 2021-08-21 | End: 2021-08-21

## 2021-08-21 RX ORDER — IBUPROFEN 600 MG/1
600 TABLET ORAL EVERY 6 HOURS
Qty: 30 TABLET | Refills: 0 | Status: SHIPPED | OUTPATIENT
Start: 2021-08-21 | End: 2021-08-21

## 2021-08-21 NOTE — PROGRESS NOTES
[facility]    OB/Gyne Post  Section Progress Note      Samuel Gates Patient Status:  Inpatient    1990 MRN [id number]   Location [facility] 3SE Attending Norma Cameron MD   [facility] Day # 3 PCP Karina Palumbo

## 2021-08-21 NOTE — DISCHARGE SUMMARY
[facility] [facility] - [facility]    Obstetrical Discharge Summary    [facility] Patient Status:  Inpatient    1990 MRN [id number]   Location [facility] 3SE Attending Aliza Grissom MD   [address] Day # 3 PCP Octaviano Delvalle, DO     Date 02/13/2020        Hospital Course: primary LTCS and bilateral salpingectomy. Routine perioperative course with DC home on POD#3.     Date of Delivery: 8/18/2021   Time of Delivery: 1:35 PM  Delivery Type: Caesarean Section    Live Information for the patien Ketones, UA Negative Negative - Trace mg/dL    Spec Gravity 1.020 1.005 - 1.030    Blood Urine Negative Negative    PH Urine 7.0 5.0 - 8.0    Protein Urine Negative Negative - Trace mg/dL    Urobilinogen Urine 0.2 0.2 - 1.0 mg/dL    Nitrite Urine Negative Authorizing Provider:  Corie Abdi MD       Collected:           08/18/2021 01:39 PM          Ordering Location:     [facility]   Received:            08/19/2021 06:47 AM                                 Birth Center margin and extends for a length of 23.5 cm with 1.1 cm maximum transverse dimension and six coils. The cord and membranes are removed and the cord shows three vessels. The disc weighs 758 grams and measures 23.5 x 21.0 x 3.6 cm.  The fetal surface is bluish Eosinophil Absolute 0.14 0.00 - 0.70 x10(3) uL    Basophil Absolute 0.03 0.00 - 0.20 x10(3) uL    Immature Granulocyte Absolute 0.10 0.00 - 1.00 x10(3) uL    Neutrophil % 74.0 %    Lymphocyte % 17.2 %    Monocyte % 7.2 %    Eosinophil % 0.8 %    Jabari Discharge Diet: As tolerated    Discharge Activity: As tolerated    Follow up: Follow-up Information     Call [facility]. Specialty: [facility]  Why: As needed  Contact information:  [address]   [facility]

## 2021-08-21 NOTE — LACTATION NOTE
LACTATION NOTE - MOTHER      Evaluation Type: Inpatient    Problems identified  Problems identified: Knowledge deficit;Milk supply WNL    Maternal history  Maternal history: Caesarean section; Anxiety; Hypothyroid;Depression    Breastfeeding goal  Breastfeed

## 2021-09-01 ENCOUNTER — TELEPHONE (OUTPATIENT)
Dept: OBGYN UNIT | Facility: HOSPITAL | Age: 31
End: 2021-09-01

## 2021-09-02 ENCOUNTER — TELEPHONE (OUTPATIENT)
Dept: OBGYN UNIT | Facility: HOSPITAL | Age: 31
End: 2021-09-02

## 2021-09-02 NOTE — PROGRESS NOTES
Reviewed self and infant care with mom, she verbalizes understanding of instruction reviewed. Encouraged to follow up with OB for headaches and weight loss of 25 pounds. Encouraged to follow up with Pediatrician for oozing from umbilical cord.

## 2021-10-04 NOTE — PROGRESS NOTES
[address]  Video Visit Progress Note    Migdalia Helms verbally consents to a Telemedicine Visit on 11/03/20. This visit is conducted using Telemedicine with live, interactive audio and video.     Yair Severino MEDICATIONS:   Current Outpatient Medications   Medication Sig Dispense Refill   • SERTRALINE  MG Oral Tab TAKE 1 TABLET(100 MG) BY MOUTH DAILY 30 tablet 0   • buPROPion HCl 75 MG Oral Tab Take 1 tablet (75 mg total) by mouth daily.  30 tablet 0   • Exam:    KNEE EXAM:  Inspection: no erythema, swelling, or obvious deformity  Palpation: Self-assessment is notable for tenderness with palpation of the left pes bursa and patella-femoral junction, patella facets medial and lateral  Active ROM: Full in fle TYPE 05/26/2020 B   Final   • RH BLOOD TYPE 05/26/2020 Positive   Final   • Antibody Screen 05/26/2020 Negative   Final   • Case Report 05/26/2020    Final                    Value:Surgical Pathology                                Case: [id number] documented in AVS summary. The patient was in agreement with the assessment and plan. All questions were answered. There were no barriers to learning.     We discussed that a telemedicine visit is in place of an office visit; however, this limits the jennifer Soft volume/Decreased productivity Soft volume/Decreased productivity Soft volume/Decreased productivity Soft volume/Decreased productivity

## 2021-10-24 ENCOUNTER — IMMUNIZATION (OUTPATIENT)
Dept: LAB | Facility: HOSPITAL | Age: 31
End: 2021-10-24
Attending: EMERGENCY MEDICINE
Payer: COMMERCIAL

## 2021-10-24 DIAGNOSIS — Z23 NEED FOR VACCINATION: Primary | ICD-10-CM

## 2021-10-24 PROCEDURE — 0003A SARSCOV2 VAC 30MCG/0.3ML IM: CPT

## 2021-12-20 ENCOUNTER — OFFICE VISIT (OUTPATIENT)
Dept: SURGERY | Facility: CLINIC | Age: 31
End: 2021-12-20
Payer: COMMERCIAL

## 2021-12-20 ENCOUNTER — TELEPHONE (OUTPATIENT)
Dept: SURGERY | Facility: CLINIC | Age: 31
End: 2021-12-20

## 2021-12-20 DIAGNOSIS — D23.30 OTHER BENIGN NEOPLASM OF SKIN OF UNSPECIFIED PART OF FACE: Primary | ICD-10-CM

## 2021-12-20 PROCEDURE — 99243 OFF/OP CNSLTJ NEW/EST LOW 30: CPT | Performed by: PLASTIC SURGERY

## 2021-12-20 RX ORDER — MAG HYDROX/ALUMINUM HYD/SIMETH 400-400-40
1 SUSPENSION, ORAL (FINAL DOSE FORM) ORAL AS DIRECTED
COMMUNITY

## 2021-12-20 RX ORDER — CETIRIZINE HYDROCHLORIDE 10 MG/1
1 CAPSULE, LIQUID FILLED ORAL AS DIRECTED
COMMUNITY
End: 2021-12-20

## 2021-12-20 RX ORDER — SERTRALINE HYDROCHLORIDE 25 MG/1
25 TABLET, FILM COATED ORAL DAILY
COMMUNITY
Start: 2021-12-11

## 2021-12-20 NOTE — TELEPHONE ENCOUNTER
Pt requested to speak with anesthesia regarding IV sedation for surgery 2/22 in regards to breastfeeding at her appointment today after we discussed saving breast milk pre-op and dumping post-op.    Called PAT and they stated pt should contact her lactation

## 2021-12-20 NOTE — PROGRESS NOTES
Patient request for surgery signed by patient and witnessed and signed by RN. Patient to take OTC meds post-operatively; patient instructed to call the office if post-operative pain is not relieved w/OTC meds.   Pre-Surgical Instruction Handout given to an

## 2021-12-20 NOTE — H&P
Talita Kaiser is a 33year old female that presents with Patient presents with: Follow - Up: Interdermal nevi  .     REFERRED BY:   Stephania Taylor MD      Pacemaker: No  Latex Allergy: no  Coumadin: No  Plavix: No  Other anticoagulants: No  Cardiac s Paroxysmal SVT (supraventricular tachycardia) (HCC)    • Post partum depression         SURGICAL  Past Surgical History:   Procedure Laterality Date   • D&C AFTER DELIVERY     • TONSILLECTOMY  2009   • WISDOM TEETH REMOVED  2008        ALLERIGIES    Other appearance - Normal  HEENT: Normocephalic  EYES: Conjunctiva - Right: Normal, Left: Normal; EOMI  EARS: Inspection - Right: Normal, Left: Normal  NECK/THYROID: Inspection - Normal, Palpation - Normal, Thyroid gland - Normal, No adenopathy  RESPIRATORY: Ins

## 2021-12-21 DIAGNOSIS — D23.30 OTHER BENIGN NEOPLASM OF SKIN OF UNSPECIFIED PART OF FACE: Primary | ICD-10-CM

## 2022-01-15 ENCOUNTER — TELEMEDICINE (OUTPATIENT)
Dept: TELEHEALTH | Age: 32
End: 2022-01-15

## 2022-01-15 DIAGNOSIS — B34.9 ACUTE VIRAL SYNDROME: Primary | ICD-10-CM

## 2022-01-15 PROCEDURE — 99421 OL DIG E/M SVC 5-10 MIN: CPT

## 2022-01-15 NOTE — PROGRESS NOTES
Has had mild cold symptoms, including headache and runny nose, a little tired. Daughters are sick with URIs and are getting tested tomorrow through [facility]. Pt is requesting Covid testing.   E-Visit time 10 minutes of care and chart review, Covid PCR test

## 2022-01-16 ENCOUNTER — LAB ENCOUNTER (OUTPATIENT)
Dept: LAB | Facility: HOSPITAL | Age: 32
End: 2022-01-16
Payer: COMMERCIAL

## 2022-01-19 LAB — SARS-COV-2 RNA RESP QL NAA+PROBE: NOT DETECTED

## 2022-02-14 ENCOUNTER — TELEPHONE (OUTPATIENT)
Dept: SURGERY | Facility: CLINIC | Age: 32
End: 2022-02-14

## 2022-02-14 NOTE — TELEPHONE ENCOUNTER
Called pt to update H&P for surgery 2/18/22. Spoke with pt. Pt requesting to cancel surgery at this time,will call to reschedule,  Dr Adrian Garrido and surgery scheduler notified.

## 2022-05-03 ENCOUNTER — OFFICE VISIT (OUTPATIENT)
Dept: OBGYN CLINIC | Facility: CLINIC | Age: 32
End: 2022-05-03
Payer: COMMERCIAL

## 2022-05-03 VITALS
HEART RATE: 62 BPM | SYSTOLIC BLOOD PRESSURE: 110 MMHG | HEIGHT: 61 IN | WEIGHT: 158 LBS | DIASTOLIC BLOOD PRESSURE: 77 MMHG | BODY MASS INDEX: 29.83 KG/M2

## 2022-05-03 DIAGNOSIS — N89.8 VAGINAL DISCHARGE: ICD-10-CM

## 2022-05-03 DIAGNOSIS — R39.9 UTI SYMPTOMS: ICD-10-CM

## 2022-05-03 DIAGNOSIS — R39.15 URINARY URGENCY: ICD-10-CM

## 2022-05-03 DIAGNOSIS — R10.2 PELVIC PAIN: ICD-10-CM

## 2022-05-03 DIAGNOSIS — K59.00 CONSTIPATION, UNSPECIFIED CONSTIPATION TYPE: ICD-10-CM

## 2022-05-03 DIAGNOSIS — N89.8 VAGINAL ODOR: ICD-10-CM

## 2022-05-03 DIAGNOSIS — N64.9 LESION OF LEFT NIPPLE: Primary | ICD-10-CM

## 2022-05-03 LAB
APPEARANCE: CLEAR
BILIRUBIN: NEGATIVE
GLUCOSE (URINE DIPSTICK): NEGATIVE MG/DL
LEUKOCYTES: NEGATIVE
MULTISTIX LOT#: NORMAL NUMERIC
NITRITE, URINE: NEGATIVE
OCCULT BLOOD: NEGATIVE
PH, URINE: 5.5 (ref 4.5–8)
PROTEIN (URINE DIPSTICK): NEGATIVE MG/DL
SPECIFIC GRAVITY: 1.02 (ref 1–1.03)
URINE-COLOR: YELLOW
UROBILINOGEN,SEMI-QN: 0.2 MG/DL (ref 0–1.9)

## 2022-05-03 PROCEDURE — 81002 URINALYSIS NONAUTO W/O SCOPE: CPT | Performed by: ADVANCED PRACTICE MIDWIFE

## 2022-05-03 PROCEDURE — 3078F DIAST BP <80 MM HG: CPT | Performed by: ADVANCED PRACTICE MIDWIFE

## 2022-05-03 PROCEDURE — 99213 OFFICE O/P EST LOW 20 MIN: CPT | Performed by: ADVANCED PRACTICE MIDWIFE

## 2022-05-03 PROCEDURE — 3074F SYST BP LT 130 MM HG: CPT | Performed by: ADVANCED PRACTICE MIDWIFE

## 2022-05-03 PROCEDURE — 3008F BODY MASS INDEX DOCD: CPT | Performed by: ADVANCED PRACTICE MIDWIFE

## 2022-05-04 ENCOUNTER — PATIENT MESSAGE (OUTPATIENT)
Dept: OBGYN CLINIC | Facility: CLINIC | Age: 32
End: 2022-05-04

## 2022-05-05 LAB
HSV1 DNA SPEC QL NAA+PROBE: NEGATIVE
HSV2 DNA SPEC QL NAA+PROBE: NEGATIVE

## 2022-05-05 NOTE — TELEPHONE ENCOUNTER
Attempted to call in [address] to [place] but they are closed. Left detailed message of the prescribing rx. Please follow up tomorrow to confirm pharmacy received voicemail.

## 2022-05-06 LAB
GENITAL VAGINOSIS SCREEN: NEGATIVE
TRICHOMONAS SCREEN: NEGATIVE

## 2022-07-13 ENCOUNTER — OFFICE VISIT (OUTPATIENT)
Dept: PHYSICAL MEDICINE AND REHAB | Facility: CLINIC | Age: 32
End: 2022-07-13
Payer: COMMERCIAL

## 2022-07-13 VITALS — HEIGHT: 61 IN | OXYGEN SATURATION: 98 % | BODY MASS INDEX: 27.38 KG/M2 | HEART RATE: 83 BPM | WEIGHT: 145 LBS

## 2022-07-13 DIAGNOSIS — M24.151 DEGENERATIVE TEAR OF ACETABULAR LABRUM OF RIGHT HIP: Primary | ICD-10-CM

## 2022-07-13 DIAGNOSIS — M24.152 DEGENERATIVE TEAR OF ACETABULAR LABRUM OF LEFT HIP: ICD-10-CM

## 2022-07-13 DIAGNOSIS — M25.559 HIP PAIN: ICD-10-CM

## 2022-07-13 DIAGNOSIS — R10.2 PELVIC PAIN: ICD-10-CM

## 2022-07-13 PROCEDURE — 3008F BODY MASS INDEX DOCD: CPT | Performed by: PHYSICAL MEDICINE & REHABILITATION

## 2022-07-13 PROCEDURE — 99214 OFFICE O/P EST MOD 30 MIN: CPT | Performed by: PHYSICAL MEDICINE & REHABILITATION

## 2022-07-13 NOTE — PATIENT INSTRUCTIONS
1) My office will call you once the MRI is approved by your insurance. You should then schedule the MRI and call my office again to make an appointment with me 2-3 days after your exam for review of your images and a further plan. If your MRI is not being performed at [facility] or its affiliates, please make sure to bring a copy of the images. 2) Please begin physical therapy as soon as possible. Meagan Mcmahon PT,DPT, MS, [address], [facility]  [place], [place] [address] [phone]  3) Tylenol 500-1000 mg every 6-8 hours as needed for pain. No more than 3000 mg daily. 4) No NSAIDS unless cleared by OBGYN/Pediatrician as you are still breastfeeding. 5) Follow up with me to review the MR.  We will then decide on continued therapy vs hip injection

## 2022-09-20 ENCOUNTER — PATIENT MESSAGE (OUTPATIENT)
Dept: PHYSICAL MEDICINE AND REHAB | Facility: CLINIC | Age: 32
End: 2022-09-20

## 2022-09-20 DIAGNOSIS — G89.29 CHRONIC PAIN OF LEFT KNEE: Primary | ICD-10-CM

## 2022-09-20 DIAGNOSIS — M22.2X9 PATELLOFEMORAL PAIN SYNDROME, UNSPECIFIED LATERALITY: ICD-10-CM

## 2022-09-20 DIAGNOSIS — M77.42 METATARSALGIA OF LEFT FOOT: ICD-10-CM

## 2022-09-20 DIAGNOSIS — M25.562 CHRONIC PAIN OF LEFT KNEE: Primary | ICD-10-CM

## 2022-09-20 NOTE — TELEPHONE ENCOUNTER
From: Jenni Caceres  To: Haley Robledo MD  Sent: 9/20/2022 2:57 PM CDT  Subject: Orthotics referral     Hi there  A while back k you had seen me for my knee (torn meniscus) and most recently for my hip. You had recommended orthotics and I actually had the fitting done but it turns out I needed a referral from you first for [facility] in [facility]. Can I have your office send that over? Thanks!   Amber Nieto

## 2022-09-21 ENCOUNTER — MED REC SCAN ONLY (OUTPATIENT)
Dept: PHYSICAL MEDICINE AND REHAB | Facility: CLINIC | Age: 32
End: 2022-09-21

## 2022-10-13 ENCOUNTER — TELEPHONE (OUTPATIENT)
Dept: PHYSICAL MEDICINE AND REHAB | Facility: CLINIC | Age: 32
End: 2022-10-13

## 2022-10-13 NOTE — TELEPHONE ENCOUNTER
Initiated authorization for Bilateral hip injections with lidocaine under ultrasound guidance CPT 20611x2 with [employer] online  Coverage is active  Status: Approved-authorization is not required per health plan

## 2022-10-25 ENCOUNTER — IMMUNIZATION (OUTPATIENT)
Dept: LAB | Age: 32
End: 2022-10-25
Attending: EMERGENCY MEDICINE
Payer: COMMERCIAL

## 2022-10-25 DIAGNOSIS — Z23 NEED FOR VACCINATION: Primary | ICD-10-CM

## 2022-10-25 PROCEDURE — 0134A SARSCOV2 VAC BVL 50MCG/0.5ML: CPT

## 2022-10-28 ENCOUNTER — OFFICE VISIT (OUTPATIENT)
Dept: PHYSICAL MEDICINE AND REHAB | Facility: CLINIC | Age: 32
End: 2022-10-28
Payer: COMMERCIAL

## 2022-10-28 ENCOUNTER — PATIENT MESSAGE (OUTPATIENT)
Dept: PHYSICAL MEDICINE AND REHAB | Facility: CLINIC | Age: 32
End: 2022-10-28

## 2022-10-28 DIAGNOSIS — M24.151 DEGENERATIVE TEAR OF ACETABULAR LABRUM OF RIGHT HIP: ICD-10-CM

## 2022-10-28 DIAGNOSIS — M24.152 DEGENERATIVE TEAR OF ACETABULAR LABRUM OF LEFT HIP: ICD-10-CM

## 2022-10-28 DIAGNOSIS — M25.559 HIP PAIN: ICD-10-CM

## 2022-10-28 DIAGNOSIS — R10.2 PELVIC PAIN: Primary | ICD-10-CM

## 2022-10-28 PROCEDURE — 20611 DRAIN/INJ JOINT/BURSA W/US: CPT | Performed by: PHYSICAL MEDICINE & REHABILITATION

## 2022-10-28 RX ORDER — LIDOCAINE HYDROCHLORIDE 10 MG/ML
20 INJECTION, SOLUTION INFILTRATION; PERINEURAL ONCE
Status: COMPLETED | OUTPATIENT
Start: 2022-10-28 | End: 2022-10-28

## 2022-10-28 RX ORDER — LIDOCAINE HYDROCHLORIDE 20 MG/ML
6 INJECTION, SOLUTION INFILTRATION; PERINEURAL ONCE
Status: COMPLETED | OUTPATIENT
Start: 2022-10-28 | End: 2022-10-28

## 2022-10-28 NOTE — PATIENT INSTRUCTIONS
Post Injection Instructions     Please do not do anything strenuous over the next two days (if you had a knee injection do not walk more than 2 city blocks, do not attend any aerobic classes, do not run, no heavy lifting, no prolong standing). You may resume your day to day activities after your injection. You may experience some mild amount of swelling after the procedure. Please ice your joint that was injected at least 5-6 times a day (15 minutes) for two days after (this will help prevent worsening pain that sometimes occurs after an injection). Only take tylenol if needed for pain for the first few days. Watch for signs of infection which include redness, warmth, worsening pain, fevers or chills. If you develop any of these signs call the office immediately at [phone]    Everyone responds differently to injections, but you can expect your peak effects a few weeks after your last injection. Shashank Trevizo.  Elijah Oakley MD  Physical Medicine and Rehabilitation/Sports Medicine  [facility]

## 2022-11-27 ENCOUNTER — PATIENT MESSAGE (OUTPATIENT)
Dept: OBGYN CLINIC | Facility: CLINIC | Age: 32
End: 2022-11-27

## 2022-11-29 RX ORDER — FLUCONAZOLE 150 MG/1
150 TABLET ORAL
Qty: 2 TABLET | Refills: 0 | Status: SHIPPED | OUTPATIENT
Start: 2022-11-29 | End: 2022-12-03

## 2023-02-16 ENCOUNTER — WALK IN (OUTPATIENT)
Dept: URGENT CARE | Age: 33
End: 2023-02-16

## 2023-02-16 VITALS
DIASTOLIC BLOOD PRESSURE: 70 MMHG | HEART RATE: 66 BPM | RESPIRATION RATE: 16 BRPM | OXYGEN SATURATION: 96 % | TEMPERATURE: 98 F | SYSTOLIC BLOOD PRESSURE: 104 MMHG

## 2023-02-16 DIAGNOSIS — J02.0 STREP PHARYNGITIS: Primary | ICD-10-CM

## 2023-02-16 DIAGNOSIS — H10.9 BACTERIAL CONJUNCTIVITIS: ICD-10-CM

## 2023-02-16 DIAGNOSIS — J01.90 ACUTE BACTERIAL SINUSITIS: ICD-10-CM

## 2023-02-16 DIAGNOSIS — B96.89 ACUTE BACTERIAL SINUSITIS: ICD-10-CM

## 2023-02-16 LAB
INTERNAL PROCEDURAL CONTROLS ACCEPTABLE: YES
S PYO AG THROAT QL IA.RAPID: POSITIVE
TEST LOT EXPIRATION DATE: ABNORMAL
TEST LOT NUMBER: ABNORMAL

## 2023-02-16 PROCEDURE — 99213 OFFICE O/P EST LOW 20 MIN: CPT | Performed by: NURSE PRACTITIONER

## 2023-02-16 PROCEDURE — 87880 STREP A ASSAY W/OPTIC: CPT | Performed by: NURSE PRACTITIONER

## 2023-02-16 RX ORDER — AMOXICILLIN AND CLAVULANATE POTASSIUM 875; 125 MG/1; MG/1
1 TABLET, FILM COATED ORAL EVERY 12 HOURS
Qty: 20 TABLET | Refills: 0 | Status: SHIPPED | OUTPATIENT
Start: 2023-02-16 | End: 2023-02-26

## 2023-02-16 RX ORDER — ERYTHROMYCIN 5 MG/G
OINTMENT OPHTHALMIC
Qty: 3.5 G | Refills: 0 | Status: SHIPPED | OUTPATIENT
Start: 2023-02-16

## 2023-02-16 ASSESSMENT — ENCOUNTER SYMPTOMS
SWOLLEN GLANDS: 1
NAUSEA: 0
PHOTOPHOBIA: 0
EYE REDNESS: 1
VOMITING: 0
CHEST TIGHTNESS: 0
CHILLS: 0
HEADACHES: 1
FATIGUE: 1
EYE ITCHING: 1
RHINORRHEA: 1
WHEEZING: 0
FEVER: 0
ABDOMINAL PAIN: 0
SINUS PAIN: 1
COUGH: 1
FOREIGN BODY SENSATION: 0
BLURRED VISION: 1
DIZZINESS: 0
DIARRHEA: 0
DOUBLE VISION: 0
EYE PAIN: 1
EYE DISCHARGE: 1
SORE THROAT: 1
SINUS PRESSURE: 1
VOICE CHANGE: 1
SHORTNESS OF BREATH: 0

## 2023-02-16 ASSESSMENT — PAIN SCALES - GENERAL: PAINLEVEL: 4

## 2023-06-06 ENCOUNTER — WALK IN (OUTPATIENT)
Dept: URGENT CARE | Age: 33
End: 2023-06-06

## 2023-06-06 VITALS
RESPIRATION RATE: 14 BRPM | WEIGHT: 140 LBS | OXYGEN SATURATION: 100 % | HEIGHT: 61 IN | TEMPERATURE: 98.2 F | HEART RATE: 68 BPM | BODY MASS INDEX: 26.43 KG/M2

## 2023-06-06 DIAGNOSIS — J02.9 ACUTE PHARYNGITIS, UNSPECIFIED ETIOLOGY: Primary | ICD-10-CM

## 2023-06-06 LAB
INTERNAL PROCEDURAL CONTROLS ACCEPTABLE: YES
S PYO AG THROAT QL IA.RAPID: NEGATIVE
TEST LOT EXPIRATION DATE: NORMAL
TEST LOT NUMBER: NORMAL

## 2023-06-06 PROCEDURE — 99213 OFFICE O/P EST LOW 20 MIN: CPT | Performed by: NURSE PRACTITIONER

## 2023-06-06 PROCEDURE — 87880 STREP A ASSAY W/OPTIC: CPT | Performed by: NURSE PRACTITIONER

## 2023-06-06 RX ORDER — SERTRALINE HYDROCHLORIDE 100 MG/1
150 TABLET, FILM COATED ORAL DAILY
COMMUNITY
Start: 2023-03-12

## 2023-06-06 RX ORDER — LORAZEPAM 0.5 MG/1
TABLET ORAL
COMMUNITY
Start: 2023-05-31

## 2023-06-06 RX ORDER — CETIRIZINE HYDROCHLORIDE 5 MG/1
10 TABLET ORAL DAILY
COMMUNITY

## 2023-06-06 ASSESSMENT — ENCOUNTER SYMPTOMS
GASTROINTESTINAL NEGATIVE: 1
ALLERGIC/IMMUNOLOGIC NEGATIVE: 1
COUGH: 1
FEVER: 0
FATIGUE: 0
SINUS PRESSURE: 0
SWOLLEN GLANDS: 0
TROUBLE SWALLOWING: 0
CONSTITUTIONAL NEGATIVE: 1
EYES NEGATIVE: 1
HEADACHES: 0
SORE THROAT: 1
SINUS PAIN: 0
SHORTNESS OF BREATH: 0

## 2023-07-17 ENCOUNTER — HOSPITAL ENCOUNTER (OUTPATIENT)
Dept: GENERAL RADIOLOGY | Facility: HOSPITAL | Age: 33
Discharge: HOME | End: 2023-07-17
Attending: PHYSICAL MEDICINE & REHABILITATION
Payer: COMMERCIAL

## 2023-07-17 ENCOUNTER — OFFICE VISIT (OUTPATIENT)
Dept: PHYSICAL MEDICINE AND REHAB | Facility: CLINIC | Age: 33
End: 2023-07-17
Payer: COMMERCIAL

## 2023-07-17 VITALS — HEART RATE: 81 BPM | DIASTOLIC BLOOD PRESSURE: 68 MMHG | SYSTOLIC BLOOD PRESSURE: 102 MMHG

## 2023-07-17 DIAGNOSIS — M24.152 DEGENERATIVE TEAR OF ACETABULAR LABRUM OF LEFT HIP: ICD-10-CM

## 2023-07-17 DIAGNOSIS — M24.151 DEGENERATIVE TEAR OF ACETABULAR LABRUM OF RIGHT HIP: ICD-10-CM

## 2023-07-17 DIAGNOSIS — M25.552 BILATERAL HIP PAIN: ICD-10-CM

## 2023-07-17 DIAGNOSIS — R10.2 PELVIC PAIN: Primary | ICD-10-CM

## 2023-07-17 DIAGNOSIS — M25.551 BILATERAL HIP PAIN: ICD-10-CM

## 2023-07-17 DIAGNOSIS — R10.2 PELVIC PAIN: ICD-10-CM

## 2023-07-17 PROCEDURE — 3078F DIAST BP <80 MM HG: CPT | Performed by: PHYSICAL MEDICINE & REHABILITATION

## 2023-07-17 PROCEDURE — 3074F SYST BP LT 130 MM HG: CPT | Performed by: PHYSICAL MEDICINE & REHABILITATION

## 2023-07-17 PROCEDURE — 73521 X-RAY EXAM HIPS BI 2 VIEWS: CPT | Performed by: PHYSICAL MEDICINE & REHABILITATION

## 2023-07-17 PROCEDURE — 99214 OFFICE O/P EST MOD 30 MIN: CPT | Performed by: PHYSICAL MEDICINE & REHABILITATION

## 2023-07-17 NOTE — PROGRESS NOTES
[address]  Progress Note    CHIEF COMPLAINT:  Patient presents with:  Hip Pain: LOV: 10/28/2022 Bilateral hip  corticosteroid injection under ultrasound guidance states the inejction did not work. She didn't do the Mri last time because she didn't have time. Bialteral hip pain in the inner joints. Pain cna be a 5/10. Sometimes she feels like she can not work. No pain meds. No pt recently. Left knee feels swollen. Knee feels puffy sinside/ PT sytates she had EDS      History of Present Illness: The patient is a 26year old  right-handed female with a significant past medical history of thyroid disease and  delivery about 11 months ago who presents with bilateral hip and groin pain. Was last seen by me in 2022 where I performed bilateral hip injections which she did not find to be very helpful. She has been working in physical therapy. She continues to rate her bilateral hip pain 5 out of 10 and is aggravated by any rotation of the hips. She denies any paresthesias or weakness. She is having some swelling in the left knee which she feels is due to being on a long walk recently.     PAST MEDICAL HISTORY:  Past Medical History:   Diagnosis Date    ADHD     Anxiety     Depression     Disorder of thyroid 2019    Hyperthyroid and hypothyroid    Fibromyalgia     History of eating disorder     Hives     Hyperthyroidism     after first baby     Hypothyroidism     Miscarriage within last 12 months     Nipple pain 2020    Paroxysmal SVT (supraventricular tachycardia) (HCC)     Post partum depression        SURGICAL HISTORY:  Past Surgical History:   Procedure Laterality Date          D&C AFTER DELIVERY      TONSILLECTOMY  2009    WISDOM TEETH REMOVED         SOCIAL HISTORY:   Social History    Occupational History      Not on file    Tobacco Use      Smoking status: Never      Smokeless tobacco: Never    Vaping Use      Vaping Use: Never used    Substance and Sexual Activity      Alcohol use: Not Currently      Drug use: Never      Sexual activity: Not on file      FAMILY HISTORY:   Family History   Problem Relation Age of Onset    Anxiety Mother     Lipids Mother     Lipids Father        CURRENT MEDICATIONS:   Current Outpatient Medications   Medication Sig Dispense Refill    ALL PURPOSE NIPPLE OINTMENT Apply 1 Application topically as needed. 30 g 0    Miconazole Nitrate 2 % External Ointment Apply to AA BID x 14 days 28.7 g 0    sertraline 25 MG Oral Tab Take 1 tablet (25 mg total) by mouth daily. sodium chloride 0.9% SOLN 100 mL with calcium gluconate 10 % SOLN 1 g, magnesium sulfate 50 % SOLN 1 g       Prenatal MV-Min-Fe Fum-FA-DHA (PRENATAL 1 OR)       Vitamin D3, Cholecalciferol, (VITAMIN D3) 125 MCG (5000 UT) Oral Cap Take 1 Bottle by mouth As Directed. docusate sodium 100 MG Oral Cap Take 1 capsule (100 mg total) by mouth 2 (two) times daily as needed for constipation. Sertraline HCl 100 MG Oral Tab Take 1 tablet (100 mg total) by mouth daily. 90 tablet 0    Cholecalciferol 100 MCG (4000 UT) Oral Cap Take 1 capsule by mouth daily. Choline 65 MG Oral Tab Take by mouth.  0    Multiple Minerals (CALCIUM-MAGNESIUM-ZINC) Oral Tab Take by mouth 3 (three) times daily. 0    Cetirizine HCl 5 MG Oral Tab Take 2 tablets (10 mg total) by mouth daily. Omega-3 1000 MG Oral Cap 1 capsule daily. PreNata 29-1 MG Oral Chew Tab 1 tablet daily. Albuterol Sulfate  (90 Base) MCG/ACT Inhalation Aero Soln Inhale 2 puffs into the lungs. ALLERGIES:     Other                   OTHER (SEE COMMENTS)    Comment:Mother has anaphylaxis to sulfa, patient has never             tried medication due to this  Estrogens               RASH    REVIEW OF SYSTEMS:   Review of Systems   Constitutional: Negative. HENT: Negative. Eyes: Negative. Respiratory: Negative. Cardiovascular: Negative.     Gastrointestinal: Negative. Genitourinary: Negative. Musculoskeletal: As per HPI  Skin: Negative. Neurological: As per HPI  Endo/Heme/Allergies: Negative. Psychiatric/Behavioral: Negative. All other systems reviewed and are negative. Pertinent positives and negatives noted in the HPI. PHYSICAL EXAM:   /68   Pulse 81     There is no height or weight on file to calculate BMI. General: No immediate distress  Head: Normocephalic/ Atraumatic  Eyes: Extra-occular movements intact. Ears: No auricular hematoma or deformities  Mouth: No lesions or ulcerations  Heart: peripheral pulses intact. Normal capillary refill. Lungs: Non-labored respirations  Abdomen: No abdominal guarding  Extremities: No lower extremity edema bilaterally   Skin: No lesions noted. Cognition: alert & oriented x 3, attentive, able to follow 2 step commands, comprehention intact, spontaneous speech intact  Motor:    Musculoskeletal:    HIP:  Inspection: no erythema, swelling, or obvious deformity  Palpation: no ttp over greater trochanter or down the ITB, ASIS, psoas muscle, anterior thigh (over quad), ischial tuberosity, or piriformis  ROM: intact to all planes of motion with pain during abduction, external rotation, and internal rotation of the hips bilaterally  Strength: 5/5 in all myotomes of the BILATERAL lower extremities   Sensation: Intact to light touch in all dermatomes of the lower extremities   Reflexes: 2/4 at L4 and S1  Hip Grind Test: Positive bilaterally  RODRIGO: Positive bilaterally for groin pain  FADIR: Noted bilaterally for groin pain      Gait:  Normal    Data  No visits with results within 6 Month(s) from this visit.    Latest known visit with results is:   Office Visit on 05/03/2022   Component Date Value Ref Range Status    Glucose Urine 05/03/2022 Negative  Negative mg/dL Final    Bilirubin Urine 05/03/2022 Negative  Negative Final    Ketones, UA 05/03/2022 Trace  Negative - Trace mg/dL Final    Spec Gravity 05/03/2022 1.025  1.005 - 1.030 Final    Blood Urine 05/03/2022 Negative  Negative Final    PH Urine 05/03/2022 5.5  5.0 - 8.0 Final    Protein Urine 05/03/2022 Negative  Negative - Trace mg/dL Final    Urobilinogen Urine 05/03/2022 0.2  0.2 - 1.0 mg/dL Final    Nitrite Urine 05/03/2022 Negative  Negative Final    Leukocyte Esterase Urine 05/03/2022 Negative  Negative Final    APPEARANCE 05/03/2022 CLEAR  Clear Final    Color Urine 05/03/2022 YELLOW  Yellow Final    Multistix Lot# 05/03/2022 [id number]  Numeric Final    Multistix Expiration Date 05/03/2022 05/28/22  Date Final    HSV Source 05/03/2022 Lesion (specify site)   Final    Herpes Simplex Virus Type 1 05/03/2022 Negative  Negative Final    Herpes Simplex Virus Type 2 05/03/2022 Negative  Negative Final    Reviewed By: 05/03/2022 Carmencita Severance, M.D. Final    Genital vaginosis screen 05/03/2022 Negative  Negative Final    Trichomonas screen 05/03/2022 Negative  Negative Final    Candida Screen 05/03/2022 Negative For Candida  Negative for Candida Final   ]      Radiology Imaging:  NA      ASSESSMENT AND PLAN:  Florentino Araujo is a 25-year-old female presents with bilateral hip pain with grinding sensation and clicking and popping which she notes during rotation of the hips. I am recommending x-rays of the pelvis as well as an MR arthrogram of both hips. My concern is she has labral pathology versus extra-articular impingement. I am recommending she use Tylenol for pain and continue her home exercise program until she follows up with me to review those images. RTC in 2 to 4 weeks for MRI review  Discharge Instructions were provided as documented in AVS summary. The patient was in agreement with the assessment and plan. All questions were answered. There were no barriers to learning.        Pelvic pain  (primary encounter diagnosis)  Degenerative tear of acetabular labrum of right hip  Degenerative tear of acetabular labrum of left hip  Bilateral hip pain    Levi Elliott MD  Physical Medicine and Rehabilitation/Sports Medicine  [facility]

## 2023-07-17 NOTE — PATIENT INSTRUCTIONS
1) Please get X-rays of the Pelvis today on your way out. 2) Please call and schedule your MRI at [phone]. Once you have your MRI scheduled, then call my office again to schedule a follow-up visit soon after your MRI so we may review the images together.   3) Follow up with me to review the images and discuss next steps

## 2023-08-02 ENCOUNTER — HOSPITAL ENCOUNTER (OUTPATIENT)
Dept: MRI IMAGING | Facility: HOSPITAL | Age: 33
Discharge: HOME | End: 2023-08-02
Attending: PHYSICAL MEDICINE & REHABILITATION
Payer: COMMERCIAL

## 2023-08-02 ENCOUNTER — HOSPITAL ENCOUNTER (OUTPATIENT)
Dept: GENERAL RADIOLOGY | Facility: HOSPITAL | Age: 33
Discharge: HOME | End: 2023-08-02
Attending: PHYSICAL MEDICINE & REHABILITATION
Payer: COMMERCIAL

## 2023-08-02 DIAGNOSIS — M24.151 DEGENERATIVE TEAR OF ACETABULAR LABRUM OF RIGHT HIP: ICD-10-CM

## 2023-08-02 DIAGNOSIS — M25.552 BILATERAL HIP PAIN: ICD-10-CM

## 2023-08-02 DIAGNOSIS — R10.2 PELVIC PAIN: ICD-10-CM

## 2023-08-02 DIAGNOSIS — M24.152 DEGENERATIVE TEAR OF ACETABULAR LABRUM OF LEFT HIP: ICD-10-CM

## 2023-08-02 DIAGNOSIS — M25.551 BILATERAL HIP PAIN: ICD-10-CM

## 2023-08-02 PROCEDURE — 77002 NEEDLE LOCALIZATION BY XRAY: CPT | Performed by: PHYSICAL MEDICINE & REHABILITATION

## 2023-08-02 PROCEDURE — 73722 MRI JOINT OF LWR EXTR W/DYE: CPT | Performed by: PHYSICAL MEDICINE & REHABILITATION

## 2023-08-02 PROCEDURE — 27093 INJECTION FOR HIP X-RAY: CPT | Performed by: PHYSICAL MEDICINE & REHABILITATION

## 2023-08-02 PROCEDURE — A9575 INJ GADOTERATE MEGLUMI 0.1ML: HCPCS | Performed by: PHYSICAL MEDICINE & REHABILITATION

## 2023-08-02 RX ORDER — LIDOCAINE HYDROCHLORIDE 10 MG/ML
INJECTION, SOLUTION EPIDURAL; INFILTRATION; INTRACAUDAL; PERINEURAL
Status: COMPLETED
Start: 2023-08-02 | End: 2023-08-02

## 2023-08-02 RX ORDER — DIPHENHYDRAMINE HYDROCHLORIDE 50 MG/ML
10 INJECTION, SOLUTION INTRAMUSCULAR; INTRAVENOUS
Status: COMPLETED | OUTPATIENT
Start: 2023-08-02 | End: 2023-08-02

## 2023-08-02 RX ADMIN — DIPHENHYDRAMINE HYDROCHLORIDE 0.2 ML: 50 INJECTION, SOLUTION INTRAMUSCULAR; INTRAVENOUS at 10:45:00

## 2023-08-02 RX ADMIN — LIDOCAINE HYDROCHLORIDE 5 ML: 10 INJECTION, SOLUTION EPIDURAL; INFILTRATION; INTRACAUDAL; PERINEURAL at 11:00:00

## 2023-08-09 ENCOUNTER — HOSPITAL ENCOUNTER (OUTPATIENT)
Dept: MRI IMAGING | Facility: HOSPITAL | Age: 33
Discharge: HOME | End: 2023-08-09
Attending: PHYSICAL MEDICINE & REHABILITATION
Payer: COMMERCIAL

## 2023-08-09 ENCOUNTER — HOSPITAL ENCOUNTER (OUTPATIENT)
Dept: GENERAL RADIOLOGY | Facility: HOSPITAL | Age: 33
Discharge: HOME | End: 2023-08-09
Attending: PHYSICAL MEDICINE & REHABILITATION
Payer: COMMERCIAL

## 2023-08-09 DIAGNOSIS — M24.151 DEGENERATIVE TEAR OF ACETABULAR LABRUM OF RIGHT HIP: ICD-10-CM

## 2023-08-09 DIAGNOSIS — M25.552 BILATERAL HIP PAIN: ICD-10-CM

## 2023-08-09 DIAGNOSIS — M24.152 DEGENERATIVE TEAR OF ACETABULAR LABRUM OF LEFT HIP: ICD-10-CM

## 2023-08-09 DIAGNOSIS — R10.2 PELVIC PAIN: ICD-10-CM

## 2023-08-09 DIAGNOSIS — M25.551 BILATERAL HIP PAIN: ICD-10-CM

## 2023-08-09 PROCEDURE — A9575 INJ GADOTERATE MEGLUMI 0.1ML: HCPCS | Performed by: PHYSICAL MEDICINE & REHABILITATION

## 2023-08-09 PROCEDURE — 77002 NEEDLE LOCALIZATION BY XRAY: CPT | Performed by: PHYSICAL MEDICINE & REHABILITATION

## 2023-08-09 PROCEDURE — 27093 INJECTION FOR HIP X-RAY: CPT | Performed by: PHYSICAL MEDICINE & REHABILITATION

## 2023-08-09 PROCEDURE — 73722 MRI JOINT OF LWR EXTR W/DYE: CPT | Performed by: PHYSICAL MEDICINE & REHABILITATION

## 2023-08-09 RX ORDER — LIDOCAINE HYDROCHLORIDE 10 MG/ML
INJECTION, SOLUTION EPIDURAL; INFILTRATION; INTRACAUDAL; PERINEURAL
Status: COMPLETED
Start: 2023-08-09 | End: 2023-08-09

## 2023-08-09 RX ORDER — LIDOCAINE HYDROCHLORIDE 10 MG/ML
10 INJECTION, SOLUTION EPIDURAL; INFILTRATION; INTRACAUDAL; PERINEURAL ONCE
Status: COMPLETED | OUTPATIENT
Start: 2023-08-09 | End: 2023-08-09

## 2023-08-09 RX ORDER — DIPHENHYDRAMINE HYDROCHLORIDE 50 MG/ML
10 INJECTION, SOLUTION INTRAMUSCULAR; INTRAVENOUS
Status: COMPLETED | OUTPATIENT
Start: 2023-08-09 | End: 2023-08-09

## 2023-08-09 RX ADMIN — LIDOCAINE HYDROCHLORIDE 10 ML: 10 INJECTION, SOLUTION EPIDURAL; INFILTRATION; INTRACAUDAL; PERINEURAL at 10:15:00

## 2023-08-09 RX ADMIN — LIDOCAINE HYDROCHLORIDE 5 ML: 10 INJECTION, SOLUTION EPIDURAL; INFILTRATION; INTRACAUDAL; PERINEURAL at 10:00:00

## 2023-08-09 RX ADMIN — DIPHENHYDRAMINE HYDROCHLORIDE 0.1 ML: 50 INJECTION, SOLUTION INTRAMUSCULAR; INTRAVENOUS at 10:14:00

## 2023-08-11 ENCOUNTER — TELEMEDICINE (OUTPATIENT)
Dept: PHYSICAL MEDICINE AND REHAB | Facility: CLINIC | Age: 33
End: 2023-08-11
Payer: COMMERCIAL

## 2023-08-11 DIAGNOSIS — M25.551 BILATERAL HIP PAIN: ICD-10-CM

## 2023-08-11 DIAGNOSIS — R10.2 PELVIC PAIN: Primary | ICD-10-CM

## 2023-08-11 DIAGNOSIS — M25.552 BILATERAL HIP PAIN: ICD-10-CM

## 2023-08-11 DIAGNOSIS — M24.152 DEGENERATIVE TEAR OF ACETABULAR LABRUM OF LEFT HIP: ICD-10-CM

## 2023-08-11 DIAGNOSIS — M24.151 DEGENERATIVE TEAR OF ACETABULAR LABRUM OF RIGHT HIP: ICD-10-CM

## 2023-08-11 PROCEDURE — 99214 OFFICE O/P EST MOD 30 MIN: CPT | Performed by: PHYSICAL MEDICINE & REHABILITATION

## 2023-08-11 NOTE — PROGRESS NOTES
[address]  Video Visit Progress Note      Telehealth outside of [address] Verbal Consent   I conducted a telehealth visit with Aixa Dawson today, 23, which was completed using two-way, real-time interactive audio and video communication. This has been done in good denis to provide continuity of care in the best interest of the provider-patient relationship, due to the COVID -19 public health crisis/national emergency where restrictions of face-to-face office visits are ongoing. Every conscious effort was taken to allow for sufficient and adequate time to complete the visit. The patient was made aware of the limitations of the telehealth visit, including treatment limitations as no physical exam could be performed. The patient was advised to call 911 or to go to the ER in case there was an emergency. The patient was also advised of the potential privacy & security concerns related to the telehealth platform. The patient was made aware of where to find [facility] notice of privacy practices, telehealth consent form and other related consent forms and documents. which are located on the [facility] website. The patient verbally agreed to telehealth consent form, related consents and the risks discussed. Lastly, the patient confirmed that they were in [place]. Included in this visit, time may have been spent reviewing labs, medications, radiology tests and decision making. Appropriate medical decision-making and tests are ordered as detailed in the plan of care above. Coding/billing information is submitted for this visit based on complexity of care and/or time spent for the visit. CHIEF COMPLAINT:  Patient presents with:  Imaging  Hip Pain      History of Present Illness:   The patient is a 26year old right-handed female with a significant past medical history of thyroid disease and  delivery about 11 months ago who presents with bilateral hip and groin pain. Was last seen by me in 2022 where I performed bilateral hip injections which she did not find to be very helpful. I have recommended an MR arthrogram of the bilateral hips which she has performed and I independently reviewed with results as below. She continues to have discomfort in the bilateral hips and groin. Tamir August PAST MEDICAL HISTORY:  Past Medical History:   Diagnosis Date    ADHD     Anxiety     Depression     Disorder of thyroid 2019    Hyperthyroid and hypothyroid    Fibromyalgia     History of eating disorder     Hives     Hyperthyroidism     after first baby     Hypothyroidism     Miscarriage within last 12 months     Nipple pain 2020    Paroxysmal SVT (supraventricular tachycardia) (HCC)     Post partum depression        SURGICAL HISTORY:  Past Surgical History:   Procedure Laterality Date          D&C AFTER DELIVERY      TONSILLECTOMY  2009    WISDOM TEETH REMOVED         SOCIAL HISTORY:   Social History    Occupational History      Not on file    Tobacco Use      Smoking status: Never      Smokeless tobacco: Never    Vaping Use      Vaping Use: Never used    Substance and Sexual Activity      Alcohol use: Not Currently      Drug use: Never      Sexual activity: Not on file      FAMILY HISTORY:   Family History   Problem Relation Age of Onset    Anxiety Mother     Lipids Mother     Lipids Father        CURRENT MEDICATIONS:   Current Outpatient Medications   Medication Sig Dispense Refill    ALL PURPOSE NIPPLE OINTMENT Apply 1 Application topically as needed. 30 g 0    Miconazole Nitrate 2 % External Ointment Apply to AA BID x 14 days 28.7 g 0    sertraline 25 MG Oral Tab Take 1 tablet (25 mg total) by mouth daily.       sodium chloride 0.9% SOLN 100 mL with calcium gluconate 10 % SOLN 1 g, magnesium sulfate 50 % SOLN 1 g       Prenatal MV-Min-Fe Fum-FA-DHA (PRENATAL 1 OR)       Vitamin D3, Cholecalciferol, (VITAMIN D3) 125 MCG (5000 UT) Oral Cap Take 1 Bottle by mouth As Directed. docusate sodium 100 MG Oral Cap Take 1 capsule (100 mg total) by mouth 2 (two) times daily as needed for constipation. Sertraline HCl 100 MG Oral Tab Take 1 tablet (100 mg total) by mouth daily. 90 tablet 0    Cholecalciferol 100 MCG (4000 UT) Oral Cap Take 1 capsule by mouth daily. Choline 65 MG Oral Tab Take by mouth.  0    Multiple Minerals (CALCIUM-MAGNESIUM-ZINC) Oral Tab Take by mouth 3 (three) times daily. 0    Cetirizine HCl 5 MG Oral Tab Take 2 tablets (10 mg total) by mouth daily. Omega-3 1000 MG Oral Cap 1 capsule daily. PreNata 29-1 MG Oral Chew Tab 1 tablet daily. Albuterol Sulfate  (90 Base) MCG/ACT Inhalation Aero Soln Inhale 2 puffs into the lungs. ALLERGIES:     Other                   OTHER (SEE COMMENTS)    Comment:Mother has anaphylaxis to sulfa, patient has never             tried medication due to this  Estrogens               RASH    REVIEW OF SYSTEMS:   Review of Systems   Constitutional: Negative. HENT: Negative. Eyes: Negative. Respiratory: Negative. Cardiovascular: Negative. Gastrointestinal: Negative. Genitourinary: Negative. Musculoskeletal: As per HPI  Skin: Negative. Neurological: As per HPI  Endo/Heme/Allergies: Negative. Psychiatric/Behavioral: Negative. All other systems reviewed and are negative. Pertinent positives and negatives noted in the HPI. PHYSICAL EXAM:     There is no height or weight on file to calculate BMI.     General: No immediate distress  Head: Normocephalic/ Atraumatic  Eyes: Extra-occular movements intact  Ears/Nose/Throat:  External appearance identifies normal appearance without obvious deformity  Cardiovascular: No cyanosis, clubbing or edema  Respiratory: Non-labored respirations  Skin: No lesions noted   Neurological: alert & oriented x 3, attentive, able to follow commands, comprehention intact, spontaneous speech intact  Psychiatric: Mood and affect appropriate        Data  No visits with results within 6 Month(s) from this visit. Latest known visit with results is:   Office Visit on 05/03/2022   Component Date Value Ref Range Status    Glucose Urine 05/03/2022 Negative  Negative mg/dL Final    Bilirubin Urine 05/03/2022 Negative  Negative Final    Ketones, UA 05/03/2022 Trace  Negative - Trace mg/dL Final    Spec Gravity 05/03/2022 1.025  1.005 - 1.030 Final    Blood Urine 05/03/2022 Negative  Negative Final    PH Urine 05/03/2022 5.5  5.0 - 8.0 Final    Protein Urine 05/03/2022 Negative  Negative - Trace mg/dL Final    Urobilinogen Urine 05/03/2022 0.2  0.2 - 1.0 mg/dL Final    Nitrite Urine 05/03/2022 Negative  Negative Final    Leukocyte Esterase Urine 05/03/2022 Negative  Negative Final    APPEARANCE 05/03/2022 CLEAR  Clear Final    Color Urine 05/03/2022 YELLOW  Yellow Final    Multistix Lot# 05/03/2022 [id number]  Numeric Final    Multistix Expiration Date 05/03/2022 05/28/22  Date Final    HSV Source 05/03/2022 Lesion (specify site)   Final    Herpes Simplex Virus Type 1 05/03/2022 Negative  Negative Final    Herpes Simplex Virus Type 2 05/03/2022 Negative  Negative Final    Reviewed By: 05/03/2022 El Nuno M.D.      Final    Genital vaginosis screen 05/03/2022 Negative  Negative Final    Trichomonas screen 05/03/2022 Negative  Negative Final    Candida Screen 05/03/2022 Negative For Candida  Negative for Candida Final   ]      Radiology Imaging:  I reviewed with the patient her MRI of the hip bilateral   MRI HIP ARTHROGRAM(CONTRAST ONLY), RT SH(CPT=73722)  Narrative: PROCEDURE: MRI HIP ARTHROGRAM(CONTRAST ONLY), RT SH (CPT=73722)     COMPARISON: Heather Arauz6), LT SH (CPT=73722), 8/02/2023, 10:30 AM.  [facility], XR HIP ARTHROGRAM W MRI LT (CPT=77002/02433), 8/02/2023, 9:39 AM.  [facility], [facility]. for [facility], XR HIP   W OR WO PELVIS 2 VIEWS BILAT(CPT=73521), 7/17/2023, 5:14 PM.  [facility], XR HIP ARTHROGRAM W MRI RT (ZOU=04616/07064), 8/09/2023, 9:07 AM.     INDICATIONS: M25.552 Bilateral hip pain M25.551 Bilateral hip pain M24.152 Degenerative tear of acetabular labrum of left hip M24.151 Degenerative tear of acetabular labrum*     TECHNIQUE: A comprehensive examination was performed utilizing a variety of imaging planes and imaging parameters to optimize visualization of suspected pathology. Images were performed with contrast.     FINDINGS:   FEMORAL HEAD:  No AVN, fracture, or significant arthropathy. ACETABULUM: No fracture or significant arthropathy. OTHER BONES: Normal appearance of the visualized portion of the pelvis. LABRUM: There is a linear cleft of contrast and high signal seen undercutting the anterior labrum at its labral bone interface (series 5, image 6 and series 10, image 6. There is no paralabral cyst.  The superior labrum and remainder of the labrum is   otherwise intact. EFFUSIONS: None. No synovitis or loose bodies. BURSAE: No evidence of iliopsoas or trochanteric bursitis. TENDONS: Normal gluteus tendons, iliopsoas tendon, and hamstring origin. MUSCLES: No tear or strain. No inappropriate atrophy. OTHER: Negative. Impression: CONCLUSION:      Nondisplaced anterior labral tear. Otherwise unremarkable right hip arthrogram.           Dictated by (CST): Christi Fisher MD on 8/09/2023 at 6:59 PM       Finalized by (CST): Christi Fisher MD on 8/09/2023 at 7:03 PM          XR HIP ARTHROGRAM W/MRI, RT (CPT=77002/88411)  Narrative: PROCEDURE: XR HIP ARTHROGRAM W MRI RT (CPT=77002/19090)     COMPARISON: [facility], MRI HIP ARTHROGRAM(CONTRAST ONLY), RT [facility] (CPT=73722), 8/09/2023, 9:56 AM.     INDICATIONS: Bilateral hip pain. Degenerative tear of right acetabular labrum.      TECHNIQUE: The patient was informed of the nature of the procedure, which included a discussion of the benefits and risks including, but not limited to, subcutaneous skin injection site cellulitis/hematoma, hemarthrosis, septic joint and pain. Informed   consent was obtained. An arthrogram was then performed in the usual sterile fashion using non-ionic and 0.1 cc of Gadolinium contrast material.  The right inguinal entry site was cleansed with Betadine, and approximately 10 cc of 1% lidocaine was used to   infiltrate the subcutaneous tissue in the right groin. Under fluoroscopic guidance, a 20 gauge spinal needle was placed into the right hip joint capsule and confirmed nonionic contrast material.  Then a mixture of 10 cc of dilute Dotarem and saline was   injected into the right hip joint capsule without complication. FLUOROSCOPY IMAGES OBTAINED:  3  FLUOROSCOPY TIME:  1.1 minutes  RADIATION DOSE (Dose Area Product):  427.8-uGy*m^2     FINDINGS:   JOINT: Normal.    OTHER: Negative. PLEASE ALSO SEE THE SEPARATE MR ARTHROGRAM REPORT FOR A COMPLETE DESCRIPTION OF THE IMAGING FINDINGS. Impression: CONCLUSION:   1. Right hip arthrogram performed. 2. The patient was advised to contact their physician or the emergency room for any complaints of excessive decreased range of motion, pain, skin erythema or cellulitis, or fever which may suggest the onset of infection.            Dictated by (CST): Susie Gonzalez MD on 8/09/2023 at 2:56 PM       Finalized by (CST): Susie Gonzalez MD on 8/09/2023 at 2:59 PM            PROCEDURE: MRI HIP ARTHROGRAM(CONTRAST ONLY), LT SH (CPT=73722)     COMPARISON: [facility], [address] 2 VIEWS BILAT(CPT=73521), 7/17/2023, 5:14 PM.  [facility], XR HIP ARTHROGRAM W MRI LT (CPT=77002/60550), 8/02/2023, 9:39 AM.     INDICATIONS: M25.552 Bilateral hip pain M25.551 Bilateral hip pain M24.152 Degenerative tear of acetabular labrum of left hip M24.151 Degenerative tear of acetabular labrum*     TECHNIQUE: A comprehensive examination was performed utilizing a variety of imaging planes and imaging parameters to optimize visualization of suspected pathology. Images were performed with contrast.     FINDINGS:  FEMORAL HEAD: No AVN, fracture, or significant arthropathy. ACETABULUM: No fracture or significant arthropathy. OTHER BONES: Normal appearance of the visualized portion of the pelvis. LABRUM: There is a linear cleft of high signal under cutting the base of the anterior labrum at its acetabular attachment consistent with a nondisplaced tear. EFFUSIONS: The joint is distended with contrast  BURSAE: No evidence of iliopsoas or trochanteric bursitis. TENDONS: Normal gluteus tendons, iliopsoas tendon, and hamstring origin. MUSCLES: No tear or strain. No inappropriate atrophy. OTHER: There is narrowing of the left ischial femoral space which measures up to 1 cm. There is subtle increased edema seen within the left quadratus femoris muscle. Impression   CONCLUSION:     Nondisplaced anterior labral tear. Narrowing of the left ischial femoral space with minimal edema within the left quadratus femoris muscle which is suspicious for ischiofemoral impingement syndrome. Dictated by (CST): Dinorah Harper MD on 8/02/2023 at 12:47 PM      Finalized by (CST): Dinorah Harper MD on 8/02/2023 at 1:09 PM     ASSESSMENT AND PLAN:  Maira Jean is a pleasant 15-year-old female who presents for follow-up of her continued bilateral hip pain. I have reviewed her MRI of the bilateral hip and pelvis and this demonstrates a labral tear as well as left ischiofemoral impingement. I am recommending bilateral hip corticosteroid injections under ultrasound guidance and continued physical therapy. She can continue working with [facility] at [facility] and focus on hip and pelvic strengthening as well as release of the quadratus femoris muscle.   She will follow-up with me in about 6 to 8 weeks after beginning therapy but sooner for the hip injections. RTC in 2 to 4 weeks for hip injection and 6 to 8 weeks after beginning therapy  Discharge Instructions were provided as documented in AVS summary. The patient was in agreement with the assessment and plan. All questions were answered. There were no barriers to learning. We discussed that a telemedicine visit is in place of an office visit; however, this limits the ability to perform a thorough physical examination which may affect objective findings related to a specific condition and can affect treatment. We also discussed that NSAIDs may mask or worsen COVID-19 infection symptoms. The patient was also informed that corticosteroids, in any form, may significantly decrease immune response and may increase risk and complications of infection. The patient was advised that given the current situation with COVID-19, it is in his/her best interest to socially distance his/herself. Given this, we are not recommending any elective procedures or office visits at the outpatient surgery center or in the office respectively unless deemed necessary. My staff will be reaching out to the patient for the elective procedure when it is considered appropriate and the patient can follow-up in office as well when appropriate. In the meantime, I will be available for telephone and video encounter. No diagnosis found. Paoli Dixon.  Alida Finn MD  Physical Medicine and Rehabilitation/Sports Medicine  MEDICAL [facility]

## 2023-08-11 NOTE — PATIENT INSTRUCTIONS
1) My office will call you to schedule the bilateral hip corticosteroid injection under ultrasound guidance once the procedure is approved by your insurance carrier. 2) continue physical therapy with Shoaib miller at [facility]. Please have her work on the quadratus femoris muscle.   3) follow-up with me about 6 to 8 weeks after beginning therapy

## 2023-08-17 ENCOUNTER — TELEPHONE (OUTPATIENT)
Dept: PHYSICAL MEDICINE AND REHAB | Facility: CLINIC | Age: 33
End: 2023-08-17

## 2023-08-17 NOTE — TELEPHONE ENCOUNTER
Initiated authorization for Bilateral hip csi under ultrasound CPT 66531-69,  with [employer]  Status: Approved-authorization is not required per health plan        Patient is already scheduled 8/29/23

## 2023-09-01 ENCOUNTER — OFFICE VISIT (OUTPATIENT)
Dept: OTOLARYNGOLOGY | Facility: CLINIC | Age: 33
End: 2023-09-01

## 2023-09-01 ENCOUNTER — TELEPHONE (OUTPATIENT)
Dept: OTOLARYNGOLOGY | Facility: CLINIC | Age: 33
End: 2023-09-01

## 2023-09-01 DIAGNOSIS — L98.9 LESION OF FACE: Primary | ICD-10-CM

## 2023-09-01 DIAGNOSIS — J34.89 NASAL LESION: ICD-10-CM

## 2023-09-01 PROCEDURE — 99243 OFF/OP CNSLTJ NEW/EST LOW 30: CPT | Performed by: OTOLARYNGOLOGY

## 2023-10-16 ENCOUNTER — TELEPHONE (OUTPATIENT)
Dept: OTOLARYNGOLOGY | Facility: CLINIC | Age: 33
End: 2023-10-16

## 2023-10-19 DIAGNOSIS — J34.89 NASAL LESION: ICD-10-CM

## 2023-10-19 DIAGNOSIS — L98.9 LESION OF EYEBROW: ICD-10-CM

## 2023-10-19 DIAGNOSIS — L98.9 BENIGN SKIN LESION OF FOREHEAD: Primary | ICD-10-CM

## 2023-10-31 ENCOUNTER — TELEPHONE (OUTPATIENT)
Dept: OTOLARYNGOLOGY | Facility: CLINIC | Age: 33
End: 2023-10-31

## 2023-10-31 DIAGNOSIS — L98.9 BENIGN SKIN LESION OF FOREHEAD: Primary | ICD-10-CM

## 2023-10-31 DIAGNOSIS — J34.89 NASAL LESION: ICD-10-CM

## 2023-10-31 NOTE — TELEPHONE ENCOUNTER
Spoke with surgery scheduler. She will address with provider and patient. Appears may be the addition of another leason to already scheduled surgery.

## 2023-11-01 PROBLEM — Z30.2 ENCOUNTER FOR TUBAL LIGATION: Status: ACTIVE | Noted: 2023-11-01

## 2023-11-01 PROBLEM — L98.9 LESION OF SKIN OF NOSE: Status: RESOLVED | Noted: 2023-11-01 | Resolved: 2023-11-01

## 2023-11-01 PROBLEM — L98.9 LESION OF SKIN OF NOSE: Status: ACTIVE | Noted: 2023-11-01

## 2023-11-01 PROCEDURE — 88305 TISSUE EXAM BY PATHOLOGIST: CPT | Performed by: OTOLARYNGOLOGY

## 2023-11-02 ENCOUNTER — TELEPHONE (OUTPATIENT)
Dept: OTOLARYNGOLOGY | Facility: CLINIC | Age: 33
End: 2023-11-02

## 2023-11-02 NOTE — TELEPHONE ENCOUNTER
Pt is post op day 1 excision of facial lesions forehead, eyebrow, nasal lesion. Per pt taking antibiotic as prescribed, incision is dry and intact. Advised pt not to remove tape over incision, pt to contact our office if symptoms change or worsen such as bleeding, drainage or pus, increased redness or swelling or fever greater than 101. Pt verbalized understanding.

## 2023-11-09 ENCOUNTER — OFFICE VISIT (OUTPATIENT)
Dept: OTOLARYNGOLOGY | Facility: CLINIC | Age: 33
End: 2023-11-09
Payer: COMMERCIAL

## 2023-11-09 DIAGNOSIS — L98.9 LESION OF FACE: Primary | ICD-10-CM

## 2023-11-09 DIAGNOSIS — J34.89 NASAL LESION: ICD-10-CM

## 2023-11-09 PROCEDURE — 99024 POSTOP FOLLOW-UP VISIT: CPT | Performed by: OTOLARYNGOLOGY

## 2023-11-09 NOTE — PROGRESS NOTES
Immanuel Kapadia is a 26year old female. Chief Complaint   Patient presents with    Post-Op     Lesions of face f/up       HISTORY OF PRESENT ILLNESS  She presents with enlarging lesions that itch of the left nasal ala, forehead and right temple. She was sent by Ksenia Bal PA-C for my opinion regarding management of these facial lesions. No bleeding drainage or pain but she notes increased itching of these lesions. Sent due to concerns about possible change in these lesions. 11/1/23 No new changes to H and P. Risks and benefits discussed and understood. She wishes to proceed. 11/9/23 doing very well all lesions came back as benign nevi.   Healing nicely here for suture removal.  Also has a lip lesion that she wishes to discuss having addressed    Social History     Socioeconomic History    Marital status:    Tobacco Use    Smoking status: Never    Smokeless tobacco: Never   Vaping Use    Vaping Use: Never used   Substance and Sexual Activity    Alcohol use: Not Currently    Drug use: Never   Other Topics Concern    Left Handed No    Right Handed Yes    Currently spends a great deal of time in the sun No    History of tanning No    Hx of Spending [place] of Time in Sun No    Bad sunburns in the past No    Tanning Salons in the Past No    Hx of Radiation Treatments No       Family History   Problem Relation Age of Onset    Anxiety Mother     Lipids Mother     Lipids Father        Past Medical History:   Diagnosis Date    ADHD     Anxiety     Asthma     Attention deficit hyperactivity disorder (ADHD)     Depression     Disorder of thyroid 02/14/2019    Hyperthyroid and hypothyroid    Fibromyalgia     History of eating disorder     Hives     Hyperthyroidism     after first baby     Hypothyroidism     Miscarriage within last 12 months     Nipple pain 06/12/2020    Paroxysmal SVT (supraventricular tachycardia)     Post partum depression        Past Surgical History:   Procedure Laterality Date       D&C AFTER DELIVERY      OTHER SURGICAL HISTORY      excision of lesion of forehead, right eyebrow, left nasal ala    TONSILLECTOMY  2009    WISDOM TEETH REMOVED           REVIEW OF SYSTEMS    System Neg/Pos Details   Constitutional Negative Fatigue, fever and weight loss. ENMT Negative Drooling. Eyes Negative Blurred vision and vision changes. Respiratory Negative Dyspnea and wheezing. Cardio Negative Chest pain, irregular heartbeat/palpitations and syncope. GI Negative Abdominal pain and diarrhea. Endocrine Negative Cold intolerance and heat intolerance. Neuro Negative Tremors. Psych Negative Anxiety and depression. Integumentary Negative Frequent skin infections, pigment change and rash. Hema/Lymph Negative Easy bleeding and easy bruising. PHYSICAL EXAM    LMP 10/26/2023 (Approximate)        Constitutional Normal Overall appearance - Normal.   Psychiatric Normal Orientation - Oriented to time, place, person & situation. Appropriate mood and affect. Neck Exam Normal Inspection - Normal. Palpation - Normal. Parotid gland - Normal. Thyroid gland - Normal.   Eyes Normal Conjunctiva - Right: Normal, Left: Normal. Pupil - Right: Normal, Left: Normal. Fundus - Right: Normal, Left: Normal.   Neurological Normal Memory - Normal. Cranial nerves - Cranial nerves II through XII grossly intact. Head/Face Normal Facial features - Normal. Eyebrows - Normal. Skull - Normal.        Nasopharynx Normal External nose - Normal. Lips/teeth/gums - Normal. Tonsils - Normal. Oropharynx - Normal.   Ears Normal Inspection - Right: Normal, Left: Normal. Canal - Right: Normal, Left: Normal. TM - Right: Normal, Left: Normal.   Skin Normal Inspection - Normal.        Lymph Detail Normal Submental. Submandibular. Anterior cervical. Posterior cervical. Supraclavicular.    Incisions  Clean dry and intact   Nose/Mouth/Throat Normal External nose - Normal. Lips-2 to 3 mm lesion of the upper lip at the lip vermilion edge/teeth/gums - Normal. Tonsils - Normal. Oropharynx - Normal.   Nose/Mouth/Throat Normal Nares - Right: Normal Left: Normal. Septum -Normal  Turbinates - Right: Normal, Left: Normal.       Current Outpatient Medications:     cephalexin 500 MG Oral Cap, Take 1 capsule (500 mg total) by mouth every 8 (eight) hours. , Disp: 21 capsule, Rfl: 0    amphetamine-dextroamphetamine 10 MG Oral Tab, Take 1 tablet (10 mg total) by mouth 2 (two) times daily. , Disp: , Rfl:     ALL PURPOSE NIPPLE OINTMENT, Apply 1 Application topically as needed. , Disp: 30 g, Rfl: 0    Miconazole Nitrate 2 % External Ointment, Apply to AA BID x 14 days, Disp: 28.7 g, Rfl: 0    sertraline 25 MG Oral Tab, Take 2 tablets (50 mg total) by mouth daily. , Disp: , Rfl:     sodium chloride 0.9% SOLN 100 mL with calcium gluconate 10 % SOLN 1 g, magnesium sulfate 50 % SOLN 1 g, , Disp: , Rfl:     Prenatal MV-Min-Fe Fum-FA-DHA (PRENATAL 1 OR), , Disp: , Rfl:     Vitamin D3, Cholecalciferol, (VITAMIN D3) 125 MCG (5000 UT) Oral Cap, Take 1 Bottle by mouth As Directed., Disp: , Rfl:     docusate sodium 100 MG Oral Cap, Take 1 capsule (100 mg total) by mouth 2 (two) times daily as needed for constipation. , Disp: , Rfl:     Sertraline HCl 100 MG Oral Tab, Take 1 tablet (100 mg total) by mouth daily. , Disp: 90 tablet, Rfl: 0    Choline 65 MG Oral Tab, Take by mouth., Disp:  , Rfl: 0    Multiple Minerals (CALCIUM-MAGNESIUM-ZINC) Oral Tab, Take by mouth 3 (three) times daily. , Disp: , Rfl: 0    Cetirizine HCl 5 MG Oral Tab, Take 2 tablets (10 mg total) by mouth daily. , Disp: , Rfl:     Omega-3 1000 MG Oral Cap, 1 capsule daily. , Disp: , Rfl:     PreNata 29-1 MG Oral Chew Tab, 1 tablet daily. , Disp: , Rfl:     Albuterol Sulfate  (90 Base) MCG/ACT Inhalation Aero Soln, Inhale 2 puffs into the lungs. , Disp: , Rfl:   ASSESSMENT AND PLAN    1. Lesion of face    2. Nasal lesion  Healing very nicely.   From a cosmetic standpoint alar lesion removed and crease appears complete normal.  She does have a small lesion of the lip vermilion at the border with the white of the lip skin. I did recommend shave excision and she will return to see me at her convenience to have this done in our [facility] office scar management discussed. I did recommend silicone sheeting        This note was prepared using [employer] voice recognition dictation software. As a result errors may occur. When identified these errors have been corrected. While every attempt is made to correct errors during dictation discrepancies may still exist    José Miguel Jones MD    11/9/2023    8:04 AM

## 2023-12-05 ENCOUNTER — TELEPHONE (OUTPATIENT)
Dept: OTOLARYNGOLOGY | Facility: CLINIC | Age: 33
End: 2023-12-05

## 2023-12-05 NOTE — TELEPHONE ENCOUNTER
----- Message from Anabela Gil MD sent at 11/2/2023  5:56 PM CDT -----  Good afternoon.   All lesions removed were benign moles

## 2023-12-11 ENCOUNTER — TELEPHONE (OUTPATIENT)
Dept: OTOLARYNGOLOGY | Facility: CLINIC | Age: 33
End: 2023-12-11

## 2023-12-11 ENCOUNTER — OFFICE VISIT (OUTPATIENT)
Dept: OTOLARYNGOLOGY | Facility: CLINIC | Age: 33
End: 2023-12-11

## 2023-12-11 DIAGNOSIS — K13.0 LESION OF LIP: Primary | ICD-10-CM

## 2023-12-11 PROCEDURE — 11310 SHAVE SKIN LESION 0.5 CM/<: CPT | Performed by: OTOLARYNGOLOGY

## 2023-12-11 RX ORDER — CYCLOBENZAPRINE HCL 5 MG
5 TABLET ORAL NIGHTLY
Qty: 30 TABLET | Refills: 1 | Status: SHIPPED | OUTPATIENT
Start: 2023-12-11

## 2023-12-11 RX ORDER — MELOXICAM 15 MG/1
15 TABLET ORAL DAILY
Qty: 30 TABLET | Refills: 3 | Status: SHIPPED | OUTPATIENT
Start: 2023-12-11

## 2023-12-11 NOTE — PROGRESS NOTES
Janes Cameron is a 26year old female. Chief Complaint   Patient presents with    Follow - Up     Here for shave biopsy        HISTORY OF PRESENT ILLNESS  She presents with enlarging lesions that itch of the left nasal ala, forehead and right temple. She was sent by Carmen Harrington PA-C for my opinion regarding management of these facial lesions. No bleeding drainage or pain but she notes increased itching of these lesions. Sent due to concerns about possible change in these lesions. 11/1/23 No new changes to H and P. Risks and benefits discussed and understood. She wishes to proceed. 11/9/23 doing very well all lesions came back as benign nevi. Healing nicely here for suture removal.  Also has a lip lesion that she wishes to discuss having addressed     12/11/23 here for biopsy of a lip lesion. Saw dermatologist who recommended biopsy. Also complains of fullness and popping in her right ear. Grinds her teeth. Does not wear a bite guard.       Social History     Socioeconomic History    Marital status:    Tobacco Use    Smoking status: Never    Smokeless tobacco: Never   Vaping Use    Vaping Use: Never used   Substance and Sexual Activity    Alcohol use: Not Currently    Drug use: Never   Other Topics Concern    Left Handed No    Right Handed Yes    Currently spends a great deal of time in the sun No    History of tanning No    Hx of Spending [place] of Time in Sun No    Bad sunburns in the past No    Tanning Salons in the Past No    Hx of Radiation Treatments No       Family History   Problem Relation Age of Onset    Anxiety Mother     Lipids Mother     Lipids Father        Past Medical History:   Diagnosis Date    ADHD     Anxiety     Asthma     Attention deficit hyperactivity disorder (ADHD)     Depression     Disorder of thyroid 02/14/2019    Hyperthyroid and hypothyroid    Fibromyalgia     History of eating disorder     Hives     Hyperthyroidism     after first baby Hypothyroidism     Miscarriage within last 12 months     Nipple pain 2020    Paroxysmal SVT (supraventricular tachycardia)     Post partum depression        Past Surgical History:   Procedure Laterality Date          D&C AFTER DELIVERY      OTHER SURGICAL HISTORY      excision of lesion of forehead, right eyebrow, left nasal ala    TONSILLECTOMY  2009    WISDOM TEETH REMOVED           REVIEW OF SYSTEMS    System Neg/Pos Details   Constitutional Negative Fatigue, fever and weight loss. ENMT Negative Drooling. Eyes Negative Blurred vision and vision changes. Respiratory Negative Dyspnea and wheezing. Cardio Negative Chest pain, irregular heartbeat/palpitations and syncope. GI Negative Abdominal pain and diarrhea. Endocrine Negative Cold intolerance and heat intolerance. Neuro Negative Tremors. Psych Negative Anxiety and depression. Integumentary Negative Frequent skin infections, pigment change and rash. Hema/Lymph Negative Easy bleeding and easy bruising. PHYSICAL EXAM    LMP 10/26/2023 (Approximate)        Constitutional Normal Overall appearance - Normal.   Psychiatric Normal Orientation - Oriented to time, place, person & situation. Appropriate mood and affect. Neck Exam Normal Inspection - Normal. Palpation - Normal. Parotid gland - Normal. Thyroid gland - Normal.   Eyes Normal Conjunctiva - Right: Normal, Left: Normal. Pupil - Right: Normal, Left: Normal. Fundus - Right: Normal, Left: Normal.   Neurological Normal Memory - Normal. Cranial nerves - Cranial nerves II through XII grossly intact.    Head/Face Normal Facial features - Normal. Eyebrows - Normal. Skull - Normal.        Nasopharynx Normal External nose - Normal. Lips/teeth/gums - Normal. Tonsils - Normal. Oropharynx - Normal.   Ears Normal Inspection - Right: Normal, Left: Normal. Canal - Right: Normal, Left: Normal. TM - Right: Normal, Left: Normal.   Skin Normal Inspection - Normal.        Lymph Detail Normal Submental. Submandibular. Anterior cervical. Posterior cervical. Supraclavicular. TMJ  Tender to palpation bilaterally   Nose/Mouth/Throat Normal External nose - Normal. Lips-2 mm lip lesion at the border of the lip vermilion and upper lip skin at the cupids bow/teeth/gums - Normal. Tonsils - Normal. Oropharynx - Normal.   Nose/Mouth/Throat Normal Nares - Right: Normal Left: Normal. Septum -Normal  Turbinates - Right: Normal, Left: Normal.   Shave biopsy lip  Timeout was performed appropriate site and patient were identified and marked appropriately. The area in question of the lip at the cupids bow superiorly was infiltrated with 1% Xylocaine with 1-100,000 epinephrine. Using a #11 blade the lesion which is at the junction of the lip vermilion and the upper lip skin was shaved off and sent in formalin to pathology. The base was cauterized with silver nitrate. No complications were noted      Current Outpatient Medications:     cephalexin 500 MG Oral Cap, Take 1 capsule (500 mg total) by mouth every 8 (eight) hours. , Disp: 21 capsule, Rfl: 0    amphetamine-dextroamphetamine 10 MG Oral Tab, Take 1 tablet (10 mg total) by mouth 2 (two) times daily. , Disp: , Rfl:     ALL PURPOSE NIPPLE OINTMENT, Apply 1 Application topically as needed. , Disp: 30 g, Rfl: 0    Miconazole Nitrate 2 % External Ointment, Apply to AA BID x 14 days, Disp: 28.7 g, Rfl: 0    sodium chloride 0.9% SOLN 100 mL with calcium gluconate 10 % SOLN 1 g, magnesium sulfate 50 % SOLN 1 g, , Disp: , Rfl:     Prenatal MV-Min-Fe Fum-FA-DHA (PRENATAL 1 OR), , Disp: , Rfl:     Vitamin D3, Cholecalciferol, (VITAMIN D3) 125 MCG (5000 UT) Oral Cap, Take 1 Bottle by mouth As Directed., Disp: , Rfl:     docusate sodium 100 MG Oral Cap, Take 1 capsule (100 mg total) by mouth 2 (two) times daily as needed for constipation. , Disp: , Rfl:     Sertraline HCl 100 MG Oral Tab, Take 1 tablet (100 mg total) by mouth daily. , Disp: 90 tablet, Rfl: 0    Choline 65 MG Oral Tab, Take by mouth., Disp:  , Rfl: 0    Multiple Minerals (CALCIUM-MAGNESIUM-ZINC) Oral Tab, Take by mouth 3 (three) times daily. , Disp: , Rfl: 0    Cetirizine HCl 5 MG Oral Tab, Take 2 tablets (10 mg total) by mouth daily. , Disp: , Rfl:     Omega-3 1000 MG Oral Cap, 1 capsule daily. , Disp: , Rfl:     PreNata 29-1 MG Oral Chew Tab, 1 tablet daily. , Disp: , Rfl:     Albuterol Sulfate  (90 Base) MCG/ACT Inhalation Aero Soln, Inhale 2 puffs into the lungs. , Disp: , Rfl:     sertraline 25 MG Oral Tab, Take 2 tablets (50 mg total) by mouth daily. (Patient not taking: Reported on 12/11/2023), Disp: , Rfl:   ASSESSMENT AND PLAN    1. Lesion of lip  Lip lesion biopsied via shave biopsy sent in formalin to path. I will call her with results of her pathology. Start meloxicam cyclobenzaprine for what appears to be a TMJ related musculoskeletal process. This note was prepared using [employer] [place] [employer] voice recognition dictation software. As a result errors may occur. When identified these errors have been corrected. While every attempt is made to correct errors during dictation discrepancies may still exist    José Miguel Abraham MD    12/11/2023    4:43 PM

## 2023-12-11 NOTE — TELEPHONE ENCOUNTER
Contacted patient per Dr. Shirlene Felty in meloxicam and cyclobenzaprine for her TMJ issues. Meloxicam should not as having a possible interaction with one of her medications therefore she should be cautious for any GI discomfort. She should contact us if she notes any problems with the use of this medication. Patient understood and aware.

## 2024-01-26 ENCOUNTER — OFFICE VISIT (OUTPATIENT)
Dept: OBGYN CLINIC | Facility: CLINIC | Age: 34
End: 2024-01-26

## 2024-01-26 VITALS
BODY MASS INDEX: 28.13 KG/M2 | WEIGHT: 149 LBS | SYSTOLIC BLOOD PRESSURE: 112 MMHG | HEIGHT: 61 IN | HEART RATE: 91 BPM | DIASTOLIC BLOOD PRESSURE: 68 MMHG

## 2024-01-26 DIAGNOSIS — Z12.4 PAPANICOLAOU SMEAR FOR CERVICAL CANCER SCREENING: ICD-10-CM

## 2024-01-26 DIAGNOSIS — Z01.419 WOMEN'S ANNUAL ROUTINE GYNECOLOGICAL EXAMINATION: Primary | ICD-10-CM

## 2024-01-26 DIAGNOSIS — N63.23 MASS OF LOWER OUTER QUADRANT OF LEFT BREAST: ICD-10-CM

## 2024-01-26 DIAGNOSIS — Z12.4 SCREENING FOR CERVICAL CANCER: ICD-10-CM

## 2024-01-26 DIAGNOSIS — Z11.3 SCREEN FOR STD (SEXUALLY TRANSMITTED DISEASE): ICD-10-CM

## 2024-01-26 PROCEDURE — 3074F SYST BP LT 130 MM HG: CPT | Performed by: ADVANCED PRACTICE MIDWIFE

## 2024-01-26 PROCEDURE — 3078F DIAST BP <80 MM HG: CPT | Performed by: ADVANCED PRACTICE MIDWIFE

## 2024-01-26 PROCEDURE — 3008F BODY MASS INDEX DOCD: CPT | Performed by: ADVANCED PRACTICE MIDWIFE

## 2024-01-26 PROCEDURE — 99395 PREV VISIT EST AGE 18-39: CPT | Performed by: ADVANCED PRACTICE MIDWIFE

## 2024-01-26 RX ORDER — METHYLPHENIDATE HYDROCHLORIDE 10 MG/1
10 TABLET ORAL 2 TIMES DAILY
COMMUNITY
Start: 2023-11-14

## 2024-01-26 RX ORDER — LORAZEPAM 0.5 MG/1
0.5 TABLET ORAL EVERY 8 HOURS PRN
COMMUNITY
Start: 2023-11-12

## 2024-01-26 NOTE — PROGRESS NOTES
Jeanie Chan is a 33 year old female.    HPI:       Jeanie Chan is a 33 year old female.   Patient's last menstrual period was 2023 (approximate).    Chief Complaint   Patient presents with    Gyn Exam     Annual exam and std       Denies abnormal discharge or vaginal irritation.      Hx Prior Abnormal Pap: No    Menarche: 10  Period Cycle (Days): 30-36  Period Duration (Days): 5  Period Flow: heavy to light  Use of Birth Control (if yes, specify type): None  Hx Prior Abnormal Pap: No    2 recent Female partners.  from Ex and came out. They are on amicable terms.   Tubal ligation for birth control but wondering about IUD to potentially not have a period.       Feels safe. Denies abuse  Desires STD testing    Last pap: 3/2016 in care Everywhere, NIL. Hx of abnormal pap no    Diet: well balanced  Exercise: regular    Denies excessive ETOH use, no marijuana, smoking, vapping or any non prescriptive drug use.     Family hx of breast or ovarian CA: no  Reports lump in left breast has had for years. Had ultrasound maybe 10 years ago and told breast tissue. No follow up since.     HPV Vaccine:  completed      PHQ-2 SCORE: 0  , done 2024   Last [place] Suicide Screening on 2024 was No Risk.       Depression Screening (PHQ-2/PHQ-9): Over the LAST 2 WEEKS   Little interest or pleasure in doing things (over the last two weeks)?: Not at all  Little interest or pleasure in doing things: Not at all    Feeling down, depressed, or hopeless (over the last two weeks)?: Not at all  Feeling down, depressed, or hopeless: Not at all    PHQ-2 SCORE: 0  PHQ-2 SCORE: 0           HISTORY:  Past Medical History:   Diagnosis Date    ADHD     Anxiety     Asthma     Attention deficit hyperactivity disorder (ADHD)     Depression     Disorder of thyroid 2019    Hyperthyroid and hypothyroid    Fibromyalgia     History of eating disorder     Hives     Hyperthyroidism     after first baby      Hypothyroidism     Miscarriage within last 12 months     Nipple pain 2020    Paroxysmal SVT (supraventricular tachycardia)     Post partum depression       Past Surgical History:   Procedure Laterality Date          D&C AFTER DELIVERY      OTHER SURGICAL HISTORY      excision of lesion of forehead, right eyebrow, left nasal ala    TONSILLECTOMY  2009    WISDOM TEETH REMOVED        Family History   Problem Relation Age of Onset    Anxiety Mother     Lipids Mother     Lipids Father       Social History:   Social History     Socioeconomic History    Marital status:    Tobacco Use    Smoking status: Never    Smokeless tobacco: Never   Vaping Use    Vaping Use: Never used   Substance and Sexual Activity    Alcohol use: Not Currently    Drug use: Never   Other Topics Concern    Left Handed No    Right Handed Yes    Currently spends a great deal of time in the sun No    History of tanning No    Hx of Spending [place] of Time in Sun No    Bad sunburns in the past No    Tanning Salons in the Past No    Hx of Radiation Treatments No        OB History    Para Term  AB Living   3 2 2 0 1 2   SAB IAB Ectopic Multiple Live Births   1 0 0 0 2       Medications (Active prior to today's visit):  Current Outpatient Medications   Medication Sig Dispense Refill    LORazepam 0.5 MG Oral Tab Take 1 tablet (0.5 mg total) by mouth every 8 (eight) hours as needed for Anxiety.      methylphenidate 10 MG Oral Tab Take 1 tablet (10 mg total) by mouth 2 (two) times daily.      amphetamine-dextroamphetamine 10 MG Oral Tab Take 1 tablet (10 mg total) by mouth 2 (two) times daily.      Prenatal MV-Min-Fe Fum-FA-DHA (PRENATAL 1 OR)       Vitamin D3, Cholecalciferol, (VITAMIN D3) 125 MCG (5000 UT) Oral Cap Take 1 Bottle by mouth As Directed.      Sertraline HCl 100 MG Oral Tab Take 1 tablet (100 mg total) by mouth daily. 90 tablet 0    Choline 65 MG Oral Tab Take by mouth.  0    Multiple Minerals  (CALCIUM-MAGNESIUM-ZINC) Oral Tab Take by mouth 3 (three) times daily.  0    Cetirizine HCl 5 MG Oral Tab Take 2 tablets (10 mg total) by mouth daily.      Omega-3 1000 MG Oral Cap 1 capsule daily.      Meloxicam 15 MG Oral Tab Take 1 tablet (15 mg total) by mouth daily. (Patient not taking: Reported on 2024) 30 tablet 3    cyclobenzaprine 5 MG Oral Tab Take 1 tablet (5 mg total) by mouth nightly. (Patient not taking: Reported on 2024) 30 tablet 1    cephalexin 500 MG Oral Cap Take 1 capsule (500 mg total) by mouth every 8 (eight) hours. (Patient not taking: Reported on 2024) 21 capsule 0    ALL PURPOSE NIPPLE OINTMENT Apply 1 Application topically as needed. (Patient not taking: Reported on 2024) 30 g 0    Miconazole Nitrate 2 % External Ointment Apply to AA BID x 14 days (Patient not taking: Reported on 2024) 28.7 g 0    sertraline 25 MG Oral Tab Take 2 tablets (50 mg total) by mouth daily. (Patient not taking: Reported on 2023)      sodium chloride 0.9% SOLN 100 mL with calcium gluconate 10 % SOLN 1 g, magnesium sulfate 50 % SOLN 1 g  (Patient not taking: Reported on 2024)      docusate sodium 100 MG Oral Cap Take 1 capsule (100 mg total) by mouth 2 (two) times daily as needed for constipation. (Patient not taking: Reported on 2024)      PreNata 29-1 MG Oral Chew Tab 1 tablet daily. (Patient not taking: Reported on 2024)      Albuterol Sulfate  (90 Base) MCG/ACT Inhalation Aero Soln Inhale 2 puffs into the lungs. (Patient not taking: Reported on 2024)         Allergies:  Allergies   Allergen Reactions    Other OTHER (SEE COMMENTS)     Mother has anaphylaxis to sulfa, patient has never tried medication due to this    Estrogens RASH    Morphine NAUSEA ONLY     Severe nausea with spinal during  section         ROS:     Review of Systems      PHYSICAL EXAM:     Vitals:    24 0840   BP: 112/68   Pulse: 91     Physical Exam  Vitals reviewed.    Constitutional:       General: She is not in acute distress.     Appearance: Normal appearance. She is well-developed. She is not ill-appearing.   HENT:      Head: Normocephalic.   Neck:      Thyroid: No thyroid mass, thyromegaly or thyroid tenderness.   Cardiovascular:      Rate and Rhythm: Normal rate and regular rhythm.      Heart sounds: Normal heart sounds.   Pulmonary:      Effort: Pulmonary effort is normal.      Breath sounds: Normal breath sounds.   Chest:   Breasts:     Breasts are symmetrical.      Right: No swelling, bleeding, inverted nipple, mass, nipple discharge, skin change or tenderness.      Left: Mass present. No swelling, bleeding, inverted nipple, nipple discharge, skin change or tenderness.          Comments: 2 cm mobile non tender lump  Abdominal:      Palpations: Abdomen is soft.      Tenderness: There is no abdominal tenderness. There is no right CVA tenderness or left CVA tenderness.   Genitourinary:     General: Normal vulva.      Pubic Area: No rash or pubic lice.       Labia:         Right: No rash, tenderness, lesion or injury.         Left: No rash, tenderness, lesion or injury.       Urethra: No prolapse, urethral pain, urethral swelling or urethral lesion.      Vagina: No signs of injury and foreign body. No vaginal discharge, erythema, tenderness, bleeding, lesions or prolapsed vaginal walls.      Cervix: No cervical motion tenderness, discharge, friability, lesion, erythema, cervical bleeding or eversion.   Musculoskeletal:         General: Normal range of motion.   Lymphadenopathy:      Lower Body: No right inguinal adenopathy. No left inguinal adenopathy.   Neurological:      Mental Status: She is alert and oriented to person, place, and time.   Psychiatric:         Speech: Speech normal.         Behavior: Behavior normal.         Thought Content: Thought content normal.         Judgment: Judgment normal.          ASSESSMENT/PLAN:      Diagnoses and all orders for this  visit:    Women's annual routine gynecological examination    Papanicolaou smear for cervical cancer screening    Screen for STD (sexually transmitted disease)  -     HCV Antibody; Future  -     Hepatitis B Surface Antigen; Future  -     HIV AG AB Combo; Future  -     T PALLIDUM SCREENING CASCADE; Future    Mass of lower outer quadrant of left breast  -     Mammo Diagnostic BILATERAL; Future        Normal gyne exam. Pap with HPV to lab  STD testing: GC/CT, HIV, Hep B & C, Trep ordered  Discussed breast awareness. Diagnostic mammogram ordered  Discussed Mirena IUD to help with decreasing/stopping menses. Risk/benefits reviewed.   Safe sex practices reviewed.   F/u 1 year or prn              1/25/2024  Saritha Campbell CNM

## 2024-01-29 LAB
C TRACH DNA SPEC QL NAA+PROBE: NEGATIVE
N GONORRHOEA DNA SPEC QL NAA+PROBE: NEGATIVE

## 2024-02-07 ENCOUNTER — TELEPHONE (OUTPATIENT)
Dept: OBGYN CLINIC | Facility: CLINIC | Age: 34
End: 2024-02-07

## 2024-02-07 LAB — HPV I/H RISK 1 DNA SPEC QL NAA+PROBE: NEGATIVE

## 2024-02-07 NOTE — TELEPHONE ENCOUNTER
----- Message from Saritha Campbell CNM sent at 2/6/2024  9:50 PM CST -----  Please call lab. Pap done but HPV was not done. I see the order but it says active future. Typically the HPV comes back before the pap.

## 2024-02-26 ENCOUNTER — TELEPHONE (OUTPATIENT)
Dept: OBGYN CLINIC | Facility: CLINIC | Age: 34
End: 2024-02-26

## 2024-02-29 ENCOUNTER — HOSPITAL ENCOUNTER (OUTPATIENT)
Dept: MAMMOGRAPHY | Facility: HOSPITAL | Age: 34
Discharge: HOME | End: 2024-02-29
Attending: ADVANCED PRACTICE MIDWIFE
Payer: COMMERCIAL

## 2024-02-29 ENCOUNTER — HOSPITAL ENCOUNTER (OUTPATIENT)
Dept: ULTRASOUND IMAGING | Facility: HOSPITAL | Age: 34
Discharge: HOME | End: 2024-02-29
Attending: ADVANCED PRACTICE MIDWIFE
Payer: COMMERCIAL

## 2024-02-29 DIAGNOSIS — N63.23 MASS OF LOWER OUTER QUADRANT OF LEFT BREAST: ICD-10-CM

## 2024-02-29 PROCEDURE — 77066 DX MAMMO INCL CAD BI: CPT | Performed by: ADVANCED PRACTICE MIDWIFE

## 2024-02-29 PROCEDURE — 77062 BREAST TOMOSYNTHESIS BI: CPT | Performed by: ADVANCED PRACTICE MIDWIFE

## 2024-02-29 PROCEDURE — 76642 ULTRASOUND BREAST LIMITED: CPT | Performed by: ADVANCED PRACTICE MIDWIFE

## 2024-03-01 ENCOUNTER — TELEPHONE (OUTPATIENT)
Dept: OBGYN CLINIC | Facility: CLINIC | Age: 34
End: 2024-03-01

## 2024-03-01 NOTE — TELEPHONE ENCOUNTER
PC to patient to discuss results. No answer, left message.     Findings benign- hamartoma. It was 2 cm in size. Nothin else needed at this time. She should monitor and if increases in size we could refer her to breast surgeon to have removed. Sometimes these can grow quite large.

## 2024-03-21 ENCOUNTER — OFFICE VISIT (OUTPATIENT)
Dept: OBGYN CLINIC | Facility: CLINIC | Age: 34
End: 2024-03-21
Payer: COMMERCIAL

## 2024-03-21 VITALS
BODY MASS INDEX: 28.7 KG/M2 | WEIGHT: 152 LBS | DIASTOLIC BLOOD PRESSURE: 71 MMHG | HEART RATE: 74 BPM | HEIGHT: 61 IN | SYSTOLIC BLOOD PRESSURE: 106 MMHG

## 2024-03-21 DIAGNOSIS — Z11.3 SCREEN FOR STD (SEXUALLY TRANSMITTED DISEASE): ICD-10-CM

## 2024-03-21 DIAGNOSIS — R30.0 BURNING WITH URINATION: ICD-10-CM

## 2024-03-21 DIAGNOSIS — N89.8 VAGINAL IRRITATION: Primary | ICD-10-CM

## 2024-03-21 LAB
BILIRUBIN: NEGATIVE
GLUCOSE (URINE DIPSTICK): NEGATIVE MG/DL
KETONES (URINE DIPSTICK): NEGATIVE MG/DL
MULTISTIX LOT#: ABNORMAL NUMERIC
NITRITE, URINE: NEGATIVE
PH, URINE: 7.5 (ref 4.5–8)
PROTEIN (URINE DIPSTICK): NEGATIVE MG/DL
SPECIFIC GRAVITY: 1.01 (ref 1–1.03)
URINE-COLOR: YELLOW
UROBILINOGEN,SEMI-QN: 0.2 MG/DL (ref 0–1.9)

## 2024-03-21 PROCEDURE — 81002 URINALYSIS NONAUTO W/O SCOPE: CPT | Performed by: ADVANCED PRACTICE MIDWIFE

## 2024-03-21 PROCEDURE — 3074F SYST BP LT 130 MM HG: CPT | Performed by: ADVANCED PRACTICE MIDWIFE

## 2024-03-21 PROCEDURE — 99214 OFFICE O/P EST MOD 30 MIN: CPT | Performed by: ADVANCED PRACTICE MIDWIFE

## 2024-03-21 PROCEDURE — 3078F DIAST BP <80 MM HG: CPT | Performed by: ADVANCED PRACTICE MIDWIFE

## 2024-03-21 PROCEDURE — 3008F BODY MASS INDEX DOCD: CPT | Performed by: ADVANCED PRACTICE MIDWIFE

## 2024-03-21 RX ORDER — FLUCONAZOLE 150 MG/1
150 TABLET ORAL ONCE
Qty: 1 TABLET | Refills: 0 | Status: SHIPPED | OUTPATIENT
Start: 2024-03-21 | End: 2024-03-21

## 2024-03-21 NOTE — PROGRESS NOTES
Chief Complaint   Patient presents with    Gyn Exam     Std testing, BV symptoms, creamy white discharge, Vaginal itching and burning,       HPI:   Jeanie is 33 year old , here today because for the past 1.5 weeks she has been having vulvar burning and swelling as well as a burning sensation during urination. Also reports a watery vaginal discharge and fishy odor. She is sexually active with 1 female monogamous partner. Interested in STI screening today.   Last pap 2024 NILM/HPV negative    Pt offered for MA to be present during exam and patient declined    Patient Active Problem List   Diagnosis    ADD (attention deficit disorder)    Fibromyalgia    Postpartum anxiety (HCC)    Disorder of eating    Normal pregnancy in first trimester (HCC)    Lactating mother ([facility])    Miscarriage within last 12 months    Grief at loss of child    Binge eating    Pelvic floor dysfunction in female    Stress incontinence    Thoracic sprain    Acute midline thoracic back pain    Chronic pain of left knee    Left foot pain    Metatarsalgia of left foot    Patellofemoral pain syndrome, unspecified laterality    Recurrent major depressive disorder, in full remission ([facility])    Generalized anxiety disorder    Pregnancy ([facility])    Encounter for tubal ligation        Note: This is a gyn only visit. Pt has PCP and is referred back to PCP for care of any non-gyn concerns listed above in the Problem List.    Medications (Active prior to today's visit):  Current Outpatient Medications   Medication Sig Dispense Refill    fluconazole (DIFLUCAN) 150 MG Oral Tab Take 1 tablet (150 mg total) by mouth once for 1 dose. 1 tablet 0    LORazepam 0.5 MG Oral Tab Take 1 tablet (0.5 mg total) by mouth every 8 (eight) hours as needed for Anxiety.      methylphenidate 10 MG Oral Tab Take 1 tablet (10 mg total) by mouth 2 (two) times daily.      amphetamine-dextroamphetamine 10 MG Oral Tab Take 1 tablet (10 mg total) by mouth 2 (two) times daily.       Prenatal MV-Min-Fe Fum-FA-DHA (PRENATAL 1 OR)       Vitamin D3, Cholecalciferol, (VITAMIN D3) 125 MCG (5000 UT) Oral Cap Take 1 Bottle by mouth As Directed.      Sertraline HCl 100 MG Oral Tab Take 1 tablet (100 mg total) by mouth daily. 90 tablet 0    Choline 65 MG Oral Tab Take by mouth.  0    Multiple Minerals (CALCIUM-MAGNESIUM-ZINC) Oral Tab Take by mouth 3 (three) times daily.  0    Cetirizine HCl 5 MG Oral Tab Take 2 tablets (10 mg total) by mouth daily.      Omega-3 1000 MG Oral Cap 1 capsule daily.      Albuterol Sulfate  (90 Base) MCG/ACT Inhalation Aero Soln Inhale 2 puffs into the lungs.      Meloxicam 15 MG Oral Tab Take 1 tablet (15 mg total) by mouth daily. (Patient not taking: Reported on 1/26/2024) 30 tablet 3    cyclobenzaprine 5 MG Oral Tab Take 1 tablet (5 mg total) by mouth nightly. (Patient not taking: Reported on 1/26/2024) 30 tablet 1    cephalexin 500 MG Oral Cap Take 1 capsule (500 mg total) by mouth every 8 (eight) hours. (Patient not taking: Reported on 1/26/2024) 21 capsule 0    ALL PURPOSE NIPPLE OINTMENT Apply 1 Application topically as needed. (Patient not taking: Reported on 1/26/2024) 30 g 0    Miconazole Nitrate 2 % External Ointment Apply to AA BID x 14 days (Patient not taking: Reported on 1/26/2024) 28.7 g 0    sertraline 25 MG Oral Tab Take 2 tablets (50 mg total) by mouth daily. (Patient not taking: Reported on 12/11/2023)      sodium chloride 0.9% SOLN 100 mL with calcium gluconate 10 % SOLN 1 g, magnesium sulfate 50 % SOLN 1 g  (Patient not taking: Reported on 1/26/2024)      docusate sodium 100 MG Oral Cap Take 1 capsule (100 mg total) by mouth 2 (two) times daily as needed for constipation. (Patient not taking: Reported on 1/26/2024)      PreNata 29-1 MG Oral Chew Tab 1 tablet daily. (Patient not taking: Reported on 1/26/2024)         Allergies:  Allergies   Allergen Reactions    Other OTHER (SEE COMMENTS)     Mother has anaphylaxis to sulfa, patient has never tried  medication due to this    Estrogens RASH    Morphine NAUSEA ONLY     Severe nausea with spinal during  section       ROS:  Review of Systems   Constitutional: Negative.    Respiratory: Negative.     Cardiovascular: Negative.    Gastrointestinal: Negative.    Genitourinary:  Positive for dysuria and vaginal discharge. Negative for difficulty urinating, dyspareunia, frequency, genital sores, hematuria, menstrual problem, pelvic pain, urgency, vaginal bleeding and vaginal pain.   Neurological: Negative.    Psychiatric/Behavioral: Negative.         PHYSICAL EXAM:  Vitals:    24 0854   BP: 106/71   Pulse: 74     Physical Exam  Vitals and nursing note reviewed.   Constitutional:       General: She is not in acute distress.     Appearance: Normal appearance. She is normal weight. She is not ill-appearing.   HENT:      Head: Normocephalic and atraumatic.   Cardiovascular:      Rate and Rhythm: Normal rate.   Pulmonary:      Effort: Pulmonary effort is normal. No respiratory distress.   Genitourinary:     General: Normal vulva.      Exam position: Lithotomy position.      Labia:         Right: No rash, tenderness, lesion or injury.         Left: No rash, tenderness, lesion or injury.       Urethra: No prolapse, urethral pain, urethral swelling or urethral lesion.      Vagina: Vaginal discharge present. No erythema, tenderness or lesions.      Cervix: No discharge, lesion or erythema.      Comments: Thick white vaginal discharge present  Skin:     General: Skin is warm and dry.   Neurological:      Mental Status: She is alert and oriented to person, place, and time.   Psychiatric:         Mood and Affect: Mood normal.         Behavior: Behavior normal.         Thought Content: Thought content normal.         Judgment: Judgment normal.            Recent Results (from the past 24 hour(s))   POC Urinalysis, Manual Dip without microscopy [[id number]]    Collection Time: 24  9:09 AM   Result Value Ref Range     Glucose Urine Negative Negative mg/dL    Bilirubin Urine Negative Negative    Ketones, UA Negative Negative - Trace mg/dL    Spec Gravity 1.010 1.005 - 1.030    Blood Urine Moderate (A) Negative    PH Urine 7.5 5.0 - 8.0    Protein Urine Negative Negative - Trace mg/dL    Urobilinogen Urine 0.2 0.2 - 1.0 mg/dL    Nitrite Urine Negative Negative    Leukocyte Esterase Urine Large (A) Negative    APPEARANCE CLOUDY\ Clear    Color Urine YELLOW Yellow    Multistix Lot# [id number] Numeric    Multistix Expiration Date 03/26/24 Date         ASSESSMENT/PLAN:     Jeanie was seen today for gyn exam.    Diagnoses and all orders for this visit:    Vaginal irritation  -     Chlamydia/GC PCR Combo; Future  -     Vaginitis Vaginosis PCR Panel; Future  -     fluconazole (DIFLUCAN) 150 MG Oral Tab; Take 1 tablet (150 mg total) by mouth once for 1 dose.  -     Vaginitis Vaginosis PCR Panel  -     Chlamydia/GC PCR Combo    Screen for STD (sexually transmitted disease)  -     Chlamydia/GC PCR Combo; Future  -     Chlamydia/GC PCR Combo    Burning with urination  -     POC Urinalysis, Manual Dip without microscopy [[id number]]  -     Urine Culture, Routine; Future  -     Chlamydia/GC PCR Combo; Future  -     Chlamydia/GC PCR Combo  -     Urine Culture, Routine      Follow-up/Return to clinic: PRN or for next annual    Counseling:   Best hygiene practices  Contraceptive method(s), STI and HIV risk reduction/condom use    I have spent 20 minutes total time on the day of the encounter, including: preparing to see the patient, ordering/reviewing labs, performing a medically appropriate exam, and providing care coordination. face to face counseling, chart review, orders and coordination of care    Patient verbalized understanding, All questions answered. No barriers to learning identified

## 2024-03-21 NOTE — PATIENT INSTRUCTIONS
Vaginal Infection: Understanding the Vaginal Environment  The vagina is a canal. It connects the uterus (womb) to the outside of the body. It is home to many types of bacteria and other tiny organisms. These different bacteria most often stay balanced in number. This keeps the vagina healthy. If the balance changes, it can cause infection.   A healthy environment  Many types of bacteria are present in a healthy vagina. When balanced, they don’t cause problems. Small amounts of yeast may also be present without causing problems. The most common type of bacteria in the vagina is lactobacillus. It helps keep the vagina at a low pH. A low pH keeps bad bacteria from taking over.  Normal vaginal discharge  The vagina makes fluid. It is sent out as discharge. This also keeps the vagina healthy. Normal discharge can be clear, white, or yellowish. Most women find that normal discharge varies in amount and color through the month.  An unhealthy environment  The vaginal environment may get out of balance. This may result in a vaginal infection. There are a few reasons this can happen. The pH may have changed. The amount of one organism, such as yeast, may increase. Or an outside organism may get into the vagina and throw off the balance:  Bacterial vaginosis (BV). BV is due to an imbalance in the normal bacteria in the vagina. Lactobacillus bacteria decrease. As a result, the numbers of bad bacteria increase.  Candidiasis (yeast infection). Yeast is a type of fungus. A yeast infection occurs when yeast cells in the vagina increase. They then attack vaginal tissues. A type of yeast called Candida albicans is often involved.  Trichomoniasis (“trich”). Trich is a parasite. It is passed from one person to another during sex. Men with trich often don’t have any symptoms. In women, it can take weeks or months before symptoms appear.  [employer] last reviewed this educational content on 3/1/2017  © 4330-2600 [employer]  [address] [place] [address]. All rights reserved. This information is not intended as a substitute for professional medical care. Always follow your healthcare professional's instructions.        Preventing Vaginitis     Use mild, unscented soap when you bathe or shower to avoid irritating your vagina.    Vaginitis is irritation or infection of the vagina or the outside opening of it (vulva). Vaginitis can be caused by bacteria, viruses, parasites, or yeast. Chemicals such as in perfumes or soaps or in spermicides can sometimes be a cause. Vaginitis can be caused by hormone changes in pregnancy or with menopause. You can help prevent vaginitis. Follow the tips below. And see your healthcare provider if you have any symptoms.   Hygiene  Stay away from chemicals. Don't use vaginal sprays. Don't use scented toilet paper or tampons that are scented. Sprays and scents have chemicals that can irritate your vagina.  Don't douche unless you are told to by your healthcare provider. Douching is rarely needed. And it upsets the normal balance in the vagina.  Wash yourself well. Wash the outer vaginal area (vulva) every day with mild, unscented soap. Keep it as dry as possible.  Wipe correctly. Make sure to wipe from front to back after a bowel movement. This helps keep from spreading bacteria from your anus to your vagina.  Change your tampon often. During your period, make sure to change your tampon as often as directed on the package. This allows the normal flow of vaginal discharge and blood.    Lifestyle  Limit your number of sexual partners. The more partners you have, the greater your risk of infection. Using condoms helps reduce your risk.  Get enough sleep. Sleep helps keep your body’s immune system healthy. This helps you fight infection.  Lose weight, if needed. Excess weight can reduce air circulation around your vagina. This can increase your risk of infection.  Exercise regularly. Regular activity  helps keep your body healthy.  Take antibiotics only as directed. Antibiotics can change the normal chemical balance in the vagina.      Clothing  Don’t sit in wet clothes. Yeast thrives when it’s warm and damp.  Don’t wear tight pants. And don’t wear tights, leggings, or hose without a cotton crotch. These types of clothing trap warmth and moisture.  Wear cotton underwear. Cotton lets air circulate around the vagina.    Symptoms of vaginitis  Irritation, swelling, or itching of the genital area  Vaginal discharge  Bad vaginal odor  Pain or burning during urination    [employer] last reviewed this educational content on 11/1/2019  © 1109-0032 [employer]. [address] [place] [address], [place] [address]. All rights reserved. This information is not intended as a substitute for professional medical care. Always follow your healthcare professional's instructions.

## 2024-03-22 LAB
BV BACTERIA DNA VAG QL NAA+PROBE: NEGATIVE
C GLABRATA DNA VAG QL NAA+PROBE: NEGATIVE
C KRUSEI DNA VAG QL NAA+PROBE: NEGATIVE
C TRACH DNA SPEC QL NAA+PROBE: NEGATIVE
CANDIDA DNA VAG QL NAA+PROBE: POSITIVE
N GONORRHOEA DNA SPEC QL NAA+PROBE: NEGATIVE
T VAGINALIS DNA VAG QL NAA+PROBE: NEGATIVE

## 2024-04-04 ENCOUNTER — TELEPHONE (OUTPATIENT)
Dept: PHYSICAL MEDICINE AND REHAB | Facility: CLINIC | Age: 34
End: 2024-04-04

## 2024-04-04 ENCOUNTER — OFFICE VISIT (OUTPATIENT)
Dept: PHYSICAL MEDICINE AND REHAB | Facility: CLINIC | Age: 34
End: 2024-04-04
Payer: COMMERCIAL

## 2024-04-04 VITALS — BODY MASS INDEX: 28.7 KG/M2 | HEIGHT: 61 IN | WEIGHT: 152 LBS

## 2024-04-04 DIAGNOSIS — M25.512 ACUTE PAIN OF LEFT SHOULDER: ICD-10-CM

## 2024-04-04 DIAGNOSIS — M75.42 SUBACROMIAL IMPINGEMENT OF LEFT SHOULDER: ICD-10-CM

## 2024-04-04 DIAGNOSIS — M67.919 TENDINOPATHY OF ROTATOR CUFF, UNSPECIFIED LATERALITY: Primary | ICD-10-CM

## 2024-04-04 DIAGNOSIS — M17.10 PRIMARY OSTEOARTHRITIS OF KNEE, UNSPECIFIED LATERALITY: ICD-10-CM

## 2024-04-04 DIAGNOSIS — R20.0 NUMBNESS AND TINGLING OF LEFT HAND: ICD-10-CM

## 2024-04-04 DIAGNOSIS — R20.0 NUMBNESS OF LEFT HAND: ICD-10-CM

## 2024-04-04 DIAGNOSIS — M22.2X9 PATELLOFEMORAL PAIN SYNDROME, UNSPECIFIED LATERALITY: ICD-10-CM

## 2024-04-04 DIAGNOSIS — R20.2 NUMBNESS AND TINGLING OF LEFT HAND: ICD-10-CM

## 2024-04-04 PROCEDURE — 99214 OFFICE O/P EST MOD 30 MIN: CPT | Performed by: PHYSICAL MEDICINE & REHABILITATION

## 2024-04-04 PROCEDURE — 20610 DRAIN/INJ JOINT/BURSA W/O US: CPT | Performed by: PHYSICAL MEDICINE & REHABILITATION

## 2024-04-04 PROCEDURE — 3008F BODY MASS INDEX DOCD: CPT | Performed by: PHYSICAL MEDICINE & REHABILITATION

## 2024-04-04 RX ORDER — TRIAMCINOLONE ACETONIDE 40 MG/ML
40 INJECTION, SUSPENSION INTRA-ARTICULAR; INTRAMUSCULAR ONCE
Status: COMPLETED | OUTPATIENT
Start: 2024-04-04 | End: 2024-04-04

## 2024-04-04 RX ORDER — LIDOCAINE HYDROCHLORIDE 10 MG/ML
7 INJECTION, SOLUTION EPIDURAL; INFILTRATION; INTRACAUDAL; PERINEURAL ONCE
Status: COMPLETED | OUTPATIENT
Start: 2024-04-04 | End: 2024-04-04

## 2024-04-04 NOTE — TELEPHONE ENCOUNTER
Initiated authorization for Right knee CSI under ultrasound guidance CPT/HCPCS 55252,  with [employer]  Status: Approved-authorization is not required per health plan however may be subject to review once claim is submitted        And    Order was blank, reviewed w/ Dr. Behar who states he performed Left subacromial corticosteroid injection     Initiated authorization for Left subacromial corticosteroid injection  CPT/HCPCS 95037,  with [employer]  Status: Approved-authorization is not required per health plan however may be subject to review once claim is submitted      Left subacromial corticosteroid injection done in office

## 2024-04-04 NOTE — PATIENT INSTRUCTIONS
1) Please get X-rays of the LEFT shoulder and left knee today on your way out.   2) Please begin physical therapy as soon as possible.   3) My office will call you to schedule the LEFT subacromial CSI once the procedure is approved by your insurance carrier.    4) Purchase resting wrist splints (Carpal tunnel splints) and wear all night while sleeping. These can be purchased on [employer], [employer], [employer], [employer], [employer]  5) We will give you home exercises of the left wrist numbness and tingling/carpal tunnel. If symptoms do not improve with splint and exercise, then we will perform a left carpal tunnel CSI   6) We can perform a right knee CSI if needed for pain.   7) My office will call you to schedule the RIGHT knee CSI under ultrasound guidance once the procedure is approved by your insurance carrier.        Post Injection Instructions     Please do not do anything strenuous over the next two days (if you had a knee injection do not walk more than 2 city blocks, do not attend any aerobic classes, do not run, no heavy lifting, no prolong standing).  You may resume your day to day activities after your injection.  You may experience some mild amount of swelling after the procedure.  Please ice your joint that was injected at least 5-6 times a day (15 minutes) for two days after (this will help prevent worsening pain that sometimes occurs after an injection).  Only take tylenol if needed for pain for the first few days.  Watch for signs of infection which include redness, warmth, worsening pain, fevers or chills.  If you develop any of these signs call the office immediately at [phone]    Everyone responds differently to injections, but you can expect your peak effects a few weeks after your last injection.  Alex B. Behar MD  Physical Medicine and Rehabilitation/Sports Medicine  [facility]

## 2024-04-04 NOTE — TELEPHONE ENCOUNTER
Per Dr Behar \"I am recommending she continue home exercise program for this and if that is not helpful, then we can perform a right knee corticosteroid injection under ultrasound guidance which I have already placed an order for. \"-LVMTCB-1st attempt  (need to update patient that per insurance we can schedule this once she completed her home exercise program, and if pain still there)

## 2024-04-04 NOTE — PROCEDURES
[facility]   Shoulder Injection Procedure Note    CHIEF COMPLAINT:    Chief Complaint   Patient presents with    Follow - Up     LOV: 8/11/2023 pt comes in with L shoulder stabbing pain when raising her shoulder up. Admits T/N in L hand when sleeping on L side. Some weakness. Admits limited ROM. Rates pain 0/10 now, reaches 4/10. Takes iburprofen PRN.        PROCEDURE PERFORMED:  Left subacromial corticosteroid injection     INDICATIONS:  Left shoulder pain    PRIMARY PROCEDURALIST:  Alex Behar, MD    INFORMED CONSENT & TIME OUT:   As documented in the Time Out and Pre-Procedure Check Lists.  Verbal consent was obtained    Vitals: [unfilled]  Labs (document last wbc, plts, hgb, and PT/INR):   Office Visit on 03/21/2024   Component Date Value Ref Range Status    Glucose Urine 03/21/2024 Negative  Negative mg/dL Final    Bilirubin Urine 03/21/2024 Negative  Negative Final    Ketones, UA 03/21/2024 Negative  Negative - Trace mg/dL Final    Spec Gravity 03/21/2024 1.010  1.005 - 1.030 Final    Blood Urine 03/21/2024 Moderate (A)  Negative Final    PH Urine 03/21/2024 7.5  5.0 - 8.0 Final    Protein Urine 03/21/2024 Negative  Negative - Trace mg/dL Final    Urobilinogen Urine 03/21/2024 0.2  0.2 - 1.0 mg/dL Final    Nitrite Urine 03/21/2024 Negative  Negative Final    Leukocyte Esterase Urine 03/21/2024 Large (A)  Negative Final    APPEARANCE 03/21/2024 CLOUDY\  Clear Final    Color Urine 03/21/2024 YELLOW  Yellow Final    Multistix Lot# 03/21/2024 [id number]  Numeric Final    Multistix Expiration Date 03/21/2024 03/26/24  Date Final    Bacterial Vaginosis 03/21/2024 Negative  Negative Final    Candida group 03/21/2024 Positive (A)  Negative Final    Nakaseomyces glabrata (Candida gla* 03/21/2024 Negative  Negative Final    Pichia kudriavzevii (Breanna yeny* 03/21/2024 Negative  Negative Final    Trichomonas vaginalis 03/21/2024 Negative  Negative Final    Chlamydia Trachomatis  Amplified RNA 03/21/2024 Negative  Negative Final    Neisseria Gonorrhoeae Amplified RNA 03/21/2024 Negative  Negative Final    Chlam/GC Source 03/21/2024 Cervical/endocervical   Final    Urine Culture 03/21/2024 No Growth at 18-24 hrs.   Final   Office Visit on 01/26/2024   Component Date Value Ref Range Status    Chlamydia Trachomatis Amplified RNA 01/26/2024 Negative  Negative Final    Neisseria Gonorrhoeae Amplified RNA 01/26/2024 Negative  Negative Final    Chlam/GC Source 01/26/2024 Cervical/endocervical   Final    Interpretation/Result 01/26/2024 Negative for intraepithelial lesion or malignancy  Negative for intraepithelial lesion or malignancy Final    Specimen Adequacy 01/26/2024 Satisfactory for evaluation. Endocervical or metaplastic cells present   Final    General Categorization 01/26/2024 Negative for intraepithelial lesion or malignancy     Final    Recommendations/Comments 01/26/2024    Final                    Value:This result contains rich text formatting which cannot be displayed here.    Procedure 01/26/2024    Final                    Value:This result contains rich text formatting which cannot be displayed here.    Clinical Information 01/26/2024    Final                    Value:This result contains rich text formatting which cannot be displayed here.    Reason for testing 01/26/2024 Screening   Final    Gyn Additional Information 01/26/2024    Final                    Value:This result contains rich text formatting which cannot be displayed here.    Case Report 01/26/2024    Final                    Value:Gynecologic Cytology                              Case: [id number]                                  Authorizing Provider:  Saritha Campbell CNM   Collected:           01/26/2024 10:54 AM          Ordering Location:     [facility]    Received:            01/29/2024 10:19 AM                                 [facility]                                                                                  [facility]                                                         First Screen:          Devante Arellano                                                               Specimen:    ThinPrep Imager Screening Pap, Cervical/endocervical                                       HPV High Risk 01/26/2024 Negative  Negative Final    HPV Source 01/26/2024 Cervical/endocervical   Final   Office Visit on 12/11/2023   Component Date Value Ref Range Status    Case Report 12/11/2023    Final                    Value:Surgical Pathology                                Case: [id number]                                  Authorizing Provider:  José Miguel Holly MD         Collected:           12/11/2023 04:46 PM          Ordering Location:     [facility]    Received:            12/11/2023 04:46 PM                                 [facility]                                                                         Pathologist:           Leonila Brown MD                                                                           Specimen:    Lip, upper, lip lesion                                                                     Final Diagnosis: 12/11/2023    Final                    Value:This result contains rich text formatting which cannot be displayed here.    Clinical Information 12/11/2023    Final                    Value:This result contains rich text formatting which cannot be displayed here.    Gross Description 12/11/2023    Final                    Value:This result contains rich text formatting which cannot be displayed here.    Interpretation 12/11/2023 Benign    Final   Hospital Outpatient Visit on 11/01/2023   Component Date Value Ref Range Status    Case Report 11/01/2023    Final                     Value:Surgical Pathology                                Case: [id number]                                  Authorizing Provider:  José Miguel Holly MD         Collected:           11/01/2023 10:27 AM          Ordering Location:     [facility]        Received:            11/01/2023 10:56 AM                                 [facility]                                                               Pathologist:           Radha Santos MD                                                        Specimens:   A) - Face, forehead lesion                                                                          B) - Face, right eyebrow lesion                                                                     C) - Nose, left nasal alar lesion                                                          Final Diagnosis: 11/01/2023    Final                    Value:This result contains rich text formatting which cannot be displayed here.    Clinical Information 11/01/2023    Final                    Value:This result contains rich text formatting which cannot be displayed here.    Gross Description 11/01/2023    Final                    Value:This result contains rich text formatting which cannot be displayed here.   ]    PROCEDURE:    The risks and benefits of intraarticular corticosteroid injection were again explained to the patient and verbal consent was obtained. The Left shoulder was prepped and draped in the usual sterile fashion. A 25 G 1.5-inch needle was introduced into the Left subacromial space in a posterolateral approach while injecting 1.5 cc of 1% lidocain. Aspiration was attempted and there was no fluid able to be aspirated. We switched the syringe to one containing 4 mL of 1% lidocaine and 1 mL of 40 mg/mL Kenalog and it was injected.  The needle was then removed and hemostasis was obtained.  The skin site was cleansed and a clean dressing was applied.  The patient tolerated the procedure well.      Patient verbalized understanding of assessment and plan.  Patient is in agreement with the plan.  All questions were answered.  No barriers to learning identified.       INSTRUCTIONS GIVEN TO PATIENT:    \"You will see an effect in the next 2-3 days.  Please contact me if you have fevers, worsening swelling, worsening pain, decreased range of motion, increased redness, chills, or anything that makes you concerned about how the joint we injected feels/looks.  If you do not reach me in a reasonable time, please report directly to the emergency room for further evaluation\"      Alex B. Behar MD, [facility]  Physical Medicine and Rehabilitation/Sports Medicine  [facility]

## 2024-04-04 NOTE — PROGRESS NOTES
[facility]  Progress Note    CHIEF COMPLAINT:    Chief Complaint   Patient presents with    Follow - Up     LOV: 2023 pt comes in with L shoulder stabbing pain when raising her shoulder up. Admits T/N in L hand when sleeping on L side. Some weakness. Admits limited ROM. Rates pain 0/10 now, reaches 4/10. Takes iburprofen PRN.        History of Present Illness:  The patient is a 33 year old RIGHT handed female with significant past medical history of anxiety and depression who presents for follow up of their left shoulder pain after a fall about 1.5 months ago. She was unsure if she felt a pop. She felt her arm abduct and has been having a 4/10. Her pain is worse with rasing the arm above shoulder height. She has not had images. She has tried ibuprofen with some relief. She denies therapy. She is also having numbnes and tingling in all of the fingers of the left hand which occurs at night. She had a history of carpal tunnel affecting the right hand during pregnancy which resolved.     PAST MEDICAL HISTORY:  Past Medical History:   Diagnosis Date    ADHD     Anxiety     Asthma ([facility])     Attention deficit hyperactivity disorder (ADHD)     Depression     Disorder of thyroid 2019    Hyperthyroid and hypothyroid    Fibromyalgia     History of eating disorder     Hives     Hyperthyroidism     after first baby     Hypothyroidism     Miscarriage within last 12 months     Nipple pain 2020    Paroxysmal SVT (supraventricular tachycardia) ([facility])     Post partum depression        SURGICAL HISTORY:  Past Surgical History:   Procedure Laterality Date          D&C AFTER DELIVERY      OTHER SURGICAL HISTORY      excision of lesion of forehead, right eyebrow, left nasal ala    TONSILLECTOMY  2009    WISDOM TEETH REMOVED         SOCIAL HISTORY:   Social History     Occupational History    Not on file   Tobacco Use    Smoking status: Never    Smokeless  tobacco: Never   Vaping Use    Vaping Use: Never used   Substance and Sexual Activity    Alcohol use: Not Currently    Drug use: Never    Sexual activity: Not on file       FAMILY HISTORY:   Family History   Problem Relation Age of Onset    Anxiety Mother     Lipids Mother     Lipids Father        CURRENT MEDICATIONS:   Current Outpatient Medications   Medication Sig Dispense Refill    Sertraline HCl 100 MG Oral Tab Take 1 tablet (100 mg total) by mouth daily. 90 tablet 0    LORazepam 0.5 MG Oral Tab Take 1 tablet (0.5 mg total) by mouth every 8 (eight) hours as needed for Anxiety.      methylphenidate 10 MG Oral Tab Take 1 tablet (10 mg total) by mouth 2 (two) times daily.      Meloxicam 15 MG Oral Tab Take 1 tablet (15 mg total) by mouth daily. (Patient not taking: Reported on 1/26/2024) 30 tablet 3    cyclobenzaprine 5 MG Oral Tab Take 1 tablet (5 mg total) by mouth nightly. (Patient not taking: Reported on 1/26/2024) 30 tablet 1    cephalexin 500 MG Oral Cap Take 1 capsule (500 mg total) by mouth every 8 (eight) hours. (Patient not taking: Reported on 1/26/2024) 21 capsule 0    amphetamine-dextroamphetamine 10 MG Oral Tab Take 1 tablet (10 mg total) by mouth 2 (two) times daily.      ALL PURPOSE NIPPLE OINTMENT Apply 1 Application topically as needed. (Patient not taking: Reported on 1/26/2024) 30 g 0    Miconazole Nitrate 2 % External Ointment Apply to AA BID x 14 days (Patient not taking: Reported on 1/26/2024) 28.7 g 0    sertraline 25 MG Oral Tab Take 2 tablets (50 mg total) by mouth daily. (Patient not taking: Reported on 12/11/2023)      sodium chloride 0.9% SOLN 100 mL with calcium gluconate 10 % SOLN 1 g, magnesium sulfate 50 % SOLN 1 g  (Patient not taking: Reported on 1/26/2024)      Prenatal MV-Min-Fe Fum-FA-DHA (PRENATAL 1 OR)       Vitamin D3, Cholecalciferol, (VITAMIN D3) 125 MCG (5000 UT) Oral Cap Take 1 Bottle by mouth As Directed.      docusate sodium 100 MG Oral Cap Take 1 capsule (100 mg  total) by mouth 2 (two) times daily as needed for constipation. (Patient not taking: Reported on 2024)      Choline 65 MG Oral Tab Take by mouth.  0    Multiple Minerals (CALCIUM-MAGNESIUM-ZINC) Oral Tab Take by mouth 3 (three) times daily.  0    Cetirizine HCl 5 MG Oral Tab Take 2 tablets (10 mg total) by mouth daily.      Omega-3 1000 MG Oral Cap 1 capsule daily.      PreNata 29-1 MG Oral Chew Tab 1 tablet daily. (Patient not taking: Reported on 2024)      Albuterol Sulfate  (90 Base) MCG/ACT Inhalation Aero Soln Inhale 2 puffs into the lungs.         ALLERGIES:   Allergies   Allergen Reactions    Other OTHER (SEE COMMENTS)     Mother has anaphylaxis to sulfa, patient has never tried medication due to this    Estrogens RASH    Morphine NAUSEA ONLY     Severe nausea with spinal during  section       REVIEW OF SYSTEMS:   Review of Systems   Constitutional: Negative.    HENT: Negative.    Eyes: Negative.    Respiratory: Negative.    Cardiovascular: Negative.    Gastrointestinal: Negative.    Genitourinary: Negative.    Musculoskeletal: As per HPI  Skin: Negative.    Neurological: As per HPI  Endo/Heme/Allergies: Negative.    Psychiatric/Behavioral: Negative.      All other systems reviewed and are negative. Pertinent positives and negatives noted in the HPI.        PHYSICAL EXAM:   Ht 61\"   Wt 152 lb (68.9 kg)   LMP 2024 (Approximate)   BMI 28.72 kg/m²     Body mass index is 28.72 kg/m².      General: No immediate distress  Head: Normocephalic/ Atraumatic  Eyes: Extra-occular movements intact.   Ears: No auricular hematoma or deformities  Mouth: No lesions or ulcerations  Heart: peripheral pulses intact. Normal capillary refill.   Lungs: Non-labored respirations  Abdomen: No abdominal guarding  Extremities: No lower extremity edema bilaterally   Skin: No lesions noted.   Cognition: alert & oriented x 3, attentive, able to follow 2 step commands, comprehention intact, spontaneous  speech intact  Motor:    Musculoskeletal:    SHOULDER:  Inspection: no erythema, swelling, or obvious deformity.  No AC joint deformity  Palpation: no ttp over clavical, AC joint, or scapular spine.  Tender to palpation over the left subacromial space and greater tuberosity  ROM: Full active range of motion of the left shoulder with pain during abduction, external rotation, and internal rotation  Strength: 5 out of 5 in all myotomes of the upper extremity2  Sensation: Intact to light touch in all dermatomes of the upper extremities  Reflexes: 2/4 at C5, C6, C7  Neer's test: Negative  Hawkin's test: Positive on the left  Cross Arm Test: negative for AC joint pain  Speed's Test: Positive on the left for pain  Yergason Test: negative for biceps tendon pain  Empty Can Test: Positive on the left for pain but without weakness  Push Off Test: Positive on the left for pain and weakness  O'Briens: Negative      Data  Office Visit on 03/21/2024   Component Date Value Ref Range Status    Glucose Urine 03/21/2024 Negative  Negative mg/dL Final    Bilirubin Urine 03/21/2024 Negative  Negative Final    Ketones, UA 03/21/2024 Negative  Negative - Trace mg/dL Final    Spec Gravity 03/21/2024 1.010  1.005 - 1.030 Final    Blood Urine 03/21/2024 Moderate (A)  Negative Final    PH Urine 03/21/2024 7.5  5.0 - 8.0 Final    Protein Urine 03/21/2024 Negative  Negative - Trace mg/dL Final    Urobilinogen Urine 03/21/2024 0.2  0.2 - 1.0 mg/dL Final    Nitrite Urine 03/21/2024 Negative  Negative Final    Leukocyte Esterase Urine 03/21/2024 Large (A)  Negative Final    APPEARANCE 03/21/2024 CLOUDY\  Clear Final    Color Urine 03/21/2024 YELLOW  Yellow Final    Multistix Lot# 03/21/2024 [id number]  Numeric Final    Multistix Expiration Date 03/21/2024 03/26/24  Date Final    Bacterial Vaginosis 03/21/2024 Negative  Negative Final    Candida group 03/21/2024 Positive (A)  Negative Final    Nakaseomyces glabrata (Candida gla* 03/21/2024 Negative   Negative Final    Ildaa estherwesleycarla (Breanna yeny* 03/21/2024 Negative  Negative Final    Trichomonas vaginalis 03/21/2024 Negative  Negative Final    Chlamydia Trachomatis Amplified RNA 03/21/2024 Negative  Negative Final    Neisseria Gonorrhoeae Amplified RNA 03/21/2024 Negative  Negative Final    Chlam/GC Source 03/21/2024 Cervical/endocervical   Final    Urine Culture 03/21/2024 No Growth at 18-24 hrs.   Final   Office Visit on 01/26/2024   Component Date Value Ref Range Status    Chlamydia Trachomatis Amplified RNA 01/26/2024 Negative  Negative Final    Neisseria Gonorrhoeae Amplified RNA 01/26/2024 Negative  Negative Final    Chlam/GC Source 01/26/2024 Cervical/endocervical   Final    Interpretation/Result 01/26/2024 Negative for intraepithelial lesion or malignancy  Negative for intraepithelial lesion or malignancy Final    Specimen Adequacy 01/26/2024 Satisfactory for evaluation. Endocervical or metaplastic cells present   Final    General Categorization 01/26/2024 Negative for intraepithelial lesion or malignancy     Final    Recommendations/Comments 01/26/2024    Final                    Value:This result contains rich text formatting which cannot be displayed here.    Procedure 01/26/2024    Final                    Value:This result contains rich text formatting which cannot be displayed here.    Clinical Information 01/26/2024    Final                    Value:This result contains rich text formatting which cannot be displayed here.    Reason for testing 01/26/2024 Screening   Final    Gyn Additional Information 01/26/2024    Final                    Value:This result contains rich text formatting which cannot be displayed here.    Case Report 01/26/2024    Final                    Value:Gynecologic Cytology                              Case: [id number]                                  Authorizing Provider:  Saritha Campbell CNM   Collected:           01/26/2024 10:54 AM          [facility]  [facility]    Received:            01/29/2024 10:19 AM                                 [facility] [place]                                                         First Screen:          Devante Arellano                                                               Specimen:    ThinPrep Imager Screening Pap, Cervical/endocervical                                       HPV High Risk 01/26/2024 Negative  Negative Final    HPV Source 01/26/2024 Cervical/endocervical   Final   Office Visit on 12/11/2023   Component Date Value Ref Range Status    Case Report 12/11/2023    Final                    Value:Surgical Pathology                                Case: [id number]                                  Authorizing Provider:  José Miguel Holly MD         Collected:           12/11/2023 04:46 PM          Ordering Location:     [facility]    Received:            12/11/2023 04:46 PM                                 [facility]                                                                         Pathologist:           Leonila Brown MD                                                                           Specimen:    Lip, upper, lip lesion                                                                     Final Diagnosis: 12/11/2023    Final                    Value:This result contains rich text formatting which cannot be displayed here.    Clinical Information 12/11/2023    Final                    Value:This result contains rich text formatting which cannot be displayed here.    Gross Description 12/11/2023    Final                    Value:This result contains rich text formatting which cannot be displayed  here.    Interpretation 12/11/2023 Benign    Final   Hospital Outpatient Visit on 11/01/2023   Component Date Value Ref Range Status    Case Report 11/01/2023    Final                    Value:Surgical Pathology                                Case: [id number]                                  Authorizing Provider:  José Miguel Holly MD         Collected:           11/01/2023 10:27 AM          Ordering Location:     [facility]        Received:            11/01/2023 10:56 AM                                 [facility]                                                               Pathologist:           Radha Santos MD                                                        Specimens:   A) - Face, forehead lesion                                                                          B) - Face, right eyebrow lesion                                                                     C) - Nose, left nasal alar lesion                                                          Final Diagnosis: 11/01/2023    Final                    Value:This result contains rich text formatting which cannot be displayed here.    Clinical Information 11/01/2023    Final                    Value:This result contains rich text formatting which cannot be displayed here.    Gross Description 11/01/2023    Final                    Value:This result contains rich text formatting which cannot be displayed here.   ]      Radiology Imaging:  NA    ASSESSMENT AND PLAN:  Jeanie is a pleasant 33-year-old female who presents with left shoulder pain after a slip and fall.  On exam, she has positive impingement signs elbow without focal weakness when testing rotator cuff muscles.  I believe her symptoms are due to rotator cuff tendinopathy with impingement syndrome although she may have a small rotator cuff tear.  I am recommending a left subacromial injection, x-rays of the left shoulder, formal physical therapy.  She will follow-up with me  in about 6 to 8 weeks for this.  As a pertains to the numbness and tingling in her left hand, she does have a positive Britney sign and a negative Phalen's test.  Her sensory and motor exam is normal in the upper extremities.  I believe she has beginnings of carpal tunnel syndrome.  I recommended she start using resting wrist splint and home exercise program which I have given to her today.  She will follow-up with me for this and if needed, we can perform a carpal tunnel and corticosteroid injection under ultrasound guidance.  As a pertains to her right knee pain, she has tenderness to palpation over the right medial joint line and patellar facet.  I am recommending she continue home exercise program for this and if that is not helpful, then we can perform a right knee corticosteroid injection under ultrasound guidance which I have already placed an order for.       RTC in 6 to 8 weeks  Discharge Instructions were provided as documented in AVS summary.  The patient was in agreement with the assessment and plan.  All questions were answered.  There were no barriers to learning.         1. Tendinopathy of rotator cuff, unspecified laterality    2. Acute pain of left shoulder    3. Numbness and tingling of left hand    4. Numbness of left hand    5. Patellofemoral pain syndrome, unspecified laterality    6. Subacromial impingement of left shoulder    7. Primary osteoarthritis of knee, unspecified laterality        Alex B. Behar MD  Physical Medicine and Rehabilitation/Sports Medicine  [facility]

## 2024-06-29 ENCOUNTER — PATIENT MESSAGE (OUTPATIENT)
Dept: OTOLARYNGOLOGY | Facility: CLINIC | Age: 34
End: 2024-06-29

## 2024-07-01 NOTE — TELEPHONE ENCOUNTER
From: Jeanie Chna  To: José Miguel Holly  Sent: 6/29/2024 7:55 AM CDT  Subject: TMJ    Hi! Last time I was in for some mole removals I also asked Dr. Holly about my jaw and ear pain. He said I had TMJ. I’m wondering if he does Botox for this purpose? My jaw is so tense and I’ve been having headaches almost daily.

## 2024-07-04 NOTE — TELEPHONE ENCOUNTER
I do not.  She may have to look for an oral surgeon or therapist who does injections of the masseter.

## 2024-07-05 NOTE — TELEPHONE ENCOUNTER
Sent patient a message informing her that Dr. Holly does not do Botox injections, she needs to find an oral surgeon or therapist who does.

## (undated) DEVICE — D AND C PACK: Brand: MEDLINE INDUSTRIES, INC.

## (undated) DEVICE — TUBING SUCTION COLLECTION SET

## (undated) DEVICE — ENCORE® LATEX ACCLAIM SIZE 8.5, STERILE LATEX POWDER-FREE SURGICAL GLOVE: Brand: ENCORE

## (undated) DEVICE — CURRETTE 8MM CVD

## (undated) DEVICE — CANISTER SCT BTL SL CAP BRKLY

## (undated) DEVICE — STERILE SURGICAL LUBRICANT, METAL TUBE: Brand: SURGILUBE

## (undated) DEVICE — SOL  .9 1000ML BTL

## (undated) DEVICE — SUCTION CANISTER, 3000CC,SAFELINER: Brand: DEROYAL

## (undated) DEVICE — CANISTER SAFETOUCH SYST DISP

## (undated) DEVICE — COVER SGL STRL LGHT HNDL BLU

## (undated) NOTE — LETTER
[facility]  Administration of Anesthesia  1.  Barb Snyder, or _________________________________ acting on her behalf, (Patient) (Dependent/Representative) request to receive anesthesia for my pending procedure/operation/treatm spinal bleeding, seizure, cardiac arrest and death. 7. AWARENESS: I understand that it is possible (but unlikely) to have explicit memory of events from the operating room while under general anesthesia.   8. ELECTROCONVULSIVE THERAPY PATIENTS: This consen signature below affirms that prior to the time of the procedure, I have explained to the patient and/or his/her guardian, the risks and benefits of undergoing anesthesia, as well as any reasonable alternatives.     __________________________________________

## (undated) NOTE — LETTER
Date: 2022      Patient Name: Chika Erazo      : 1990        Thank you for choosing [facility] as your health care provider. Your physician has deemed the following medical service(s) necessary. However, your insurance plan may not pay for all of your health care and costs and may deny payment for this service. The fact that your insurance plan does not pay for an item or service does not mean you should not receive it. The purpose of this form is to help you make an informed decision about whether or not you want to receive this service(s) that may not be paid for by your insurance plan. CPT Code Description     Cost     _________  Bilateral hip injections with lidocaine under ultrasound guidance      _________ ______________________________ _____________      _________ ______________________________ _____________      I understand that the above mentioned service(s) or supply may not be covered by my insurance company.  I agree to be financially responsible for the cost of this service or supply in the event of my insurance denies payment as a non-covered benefit.        ______________________________________________________________________  Signature of Patient or Patient's Representative  Relationship  Date    ______________________________________________________________________  Signature of Witness to signing of form   Printed Name

## (undated) NOTE — LETTER
2/26/2024              Jeanie Chan        [address] [facility] [address]         Dear Jeanie,    Our records indicate that the tests ordered for you by Saritha Campbell CNM  have not been done.  If you have, in fact, already completed the tests or you do not wish to have the tests done, please contact our office at THE NUMBER LISTED BELOW.  Otherwise, please proceed with the testing.  Enclosed is a duplicate order for your convenience.        Sincerely,    Saritha Campbell CNM  [facility] [place] [address] [phone]

## (undated) NOTE — MR AVS SNAPSHOT
After Visit Summary   2024    Jeanie Chan   MRN: [id number]           Visit Information     Date & Time  2024  8:45 AM Provider  Saritha Campbell, [place] Department  [facility] Phone  [phone]      Your Vitals Were  Most recent update: 2024  8:43 AM    BP   112/68 (BP Location: Left arm, Patient Position: Sitting, Cuff Size: adult)          Pulse   91          Ht   61\"          Wt   149 lb          LMP   2023 (Approximate)             BMI   28.15 kg/m²         Allergies as of 2024  Review status set to Review Complete on 2024       Noted Reaction Type Reactions    Other 2015    OTHER (SEE COMMENTS)    Mother has anaphylaxis to sulfa, patient has never tried medication due to this    Estrogens 2014    RASH    Morphine 10/28/2023   Side Effect NAUSEA ONLY    Severe nausea with spinal during  section      Your Current Medications        Dosage    LORazepam 0.5 MG Oral Tab Take 1 tablet (0.5 mg total) by mouth every 8 (eight) hours as needed for Anxiety.    methylphenidate 10 MG Oral Tab Take 1 tablet (10 mg total) by mouth 2 (two) times daily.    amphetamine-dextroamphetamine 10 MG Oral Tab Take 1 tablet (10 mg total) by mouth 2 (two) times daily.    Prenatal MV-Min-Fe Fum-FA-DHA (PRENATAL 1 OR)     Vitamin D3, Cholecalciferol, (VITAMIN D3) 125 MCG (5000 UT) Oral Cap Take 1 Bottle by mouth As Directed.    Sertraline HCl 100 MG Oral Tab Take 1 tablet (100 mg total) by mouth daily.    Choline 65 MG Oral Tab Take by mouth.    Multiple Minerals (CALCIUM-MAGNESIUM-ZINC) Oral Tab Take by mouth 3 (three) times daily.    Cetirizine HCl 5 MG Oral Tab Take 2 tablets (10 mg total) by mouth daily.    Omega-3 1000 MG Oral Cap 1 capsule daily.    Meloxicam 15 MG Oral Tab Take 1 tablet (15 mg total) by mouth daily.    cyclobenzaprine 5 MG Oral Tab Take 1 tablet (5 mg total) by mouth nightly.     cephalexin 500 MG Oral Cap Take 1 capsule (500 mg total) by mouth every 8 (eight) hours.    ALL PURPOSE NIPPLE OINTMENT Apply 1 Application topically as needed.    Miconazole Nitrate 2 % External Ointment Apply to AA BID x 14 days    sertraline 25 MG Oral Tab Take 2 tablets (50 mg total) by mouth daily.    sodium chloride 0.9% SOLN 100 mL with calcium gluconate 10 % SOLN 1 g, magnesium sulfate 50 % SOLN 1 g     docusate sodium 100 MG Oral Cap Take 1 capsule (100 mg total) by mouth 2 (two) times daily as needed for constipation.    PreNata 29-1 MG Oral Chew Tab 1 tablet daily.    Albuterol Sulfate  (90 Base) MCG/ACT Inhalation Aero Soln Inhale 2 puffs into the lungs.      Diagnoses for This Visit    Women's annual routine gynecological examination   [9232702]  -  Primary  Papanicolaou smear for cervical cancer screening   [020409]    Screen for STD (sexually transmitted disease)   [752036]    Mass of lower outer quadrant of left breast   [6405937]    Screening for cervical cancer   [667616]             Follow-up    Return in about 1 year (around 1/26/2025).     We Ordered the Following     Normal Orders This Visit    Chlamydia/Gc Amplification [2654973 CUSTOM]     THINPREP PAP SMEAR ONLY [[id number] CUSTOM]     ThinPrep PAP Smear [[id number] CUSTOM]     Future Labs/Procedures Expected by Expires    Chlamydia/Gc Amplification [[id number] CUSTOM]  1/26/2024 (Approximate) 1/26/2025    HCV Antibody [[id number] CUSTOM]  1/26/2024 (Approximate) 1/26/2025    Hepatitis B Surface Antigen [[id number] CUSTOM]  1/26/2024 (Approximate) 1/26/2025    HIV AG AB Combo [[id number] CUSTOM]  1/26/2024 (Approximate) 1/26/2025    Hpv Dna  High Risk , Thin Prep Collect [[id number] CUSTOM]  1/26/2024 1/26/2025    Mammo Diagnostic BILATERAL [31251 CPT(R)]  1/26/2024 (Approximate) 1/26/2025    T PALLIDUM SCREENING CASCADE [5834521 CPT(R)]  1/26/2024 (Approximate) 1/26/2025    ThinPrep PAP Smear [[id number] CUSTOM]  1/26/2024 1/26/2025      Future Appointments         Provider Department    2/15/2024 6:40 AM [facility]      Follow-up Instructions    Return in about 1 year (around 1/26/2025).     Imaging Scheduling Instructions     Around January 26, 2024   Imaging:   Mammo Diagnostic BILATERAL    Instructions: To schedule a test at any [facility] Facility, call Central Scheduling at [phone], Monday through Friday between 7:30am to 6pm and on Saturday between 8am and 1pm.      [facility]    [facility] ([place])        [address]    [place], [place]          It is the patient's responsibility to check with and follow their insurance company's guidelines for prior authorization for this test.  You may be held responsible for payment in full if proper authorization is not acquired.  Please contact the Patient Business Office at [phone] if you have   any questions related to insurance coverage.  Thank you.    Children: Children under the age of 12 must have another adult caregiver with them. Please do not bring your child/children without a caregiver. Because of the highly sensitive equipment and privacy of all our patients, children will not be permitted in the exam rooms, unless otherwise noted and in   accordance with departmental policy.                  Did you know that [facility] primary care physicians now offer Video Visits through "[employer] for adult patients for a variety of conditions such as allergies, back pain and cold symptoms? Skip the drive and waiting room and online chat with a doctor face-to-face using your web-cam enabled computer or mobile device wherever you are. Video Visits cost $50 and can be paid hassle-free using a credit, debit, or health savings card.  Not active on "[employer]? Ask us how to get signed up today!          If you receive a survey from [employer], please take a few minutes to complete it and provide feedback. We strive to deliver the best  patient experience and are looking for ways to make improvements. Your feedback will help us do so. For more information on [employer], please visit [email or web address]           No text in SmartText           No text in SmartText

## (undated) NOTE — LETTER
AUTHORIZATION FOR SURGICAL OPERATION OR OTHER PROCEDURE    1. I hereby authorize Dr. Alex Behar and the [facility] staff assigned to my case to perform the following operation and/or procedure at the [facility]:    LEFT SUBACROMIAL CSI under US guidance    2.  My physician has explained the nature and purpose of the operation or other procedure, possible alternative methods of treatment, the risks involved, and the possibility of complication to me.  I acknowledge that no guarantee has been made as to the result that may be obtained.  3.  I recognize that, during the course of this operation, or other procedure, unforseen conditions may necessitate additional or different procedure than those listed above.  I, therefore, further authorize and request that the above named physician, his/her physician assistants or designees perform such procedures as are, in his/her professional opinion, necessary and desirable.  4.  Any tissue or organs removed in the operation or other procedure may be disposed of by and at the discretion of the [facility] staff and [facility].  5.  I understand that in the event of a medical emergency, I will be transported by local paramedics to [facility] or other hospital emergency department.  6.  I certify that I have read and fully understand the above consent to operation and/or other procedure.    7.  I acknowledge that my physician has explained sedation/analgesia administration to me including the risks and benefits.  I consent to the administration of sedation/analgesia as may be necessary or desirable in the judgement of my physician.    Witness signature: ___________________________________________________ Date:  ______/______/_____                    Time:  ________ A.M.  P.M.       Patient Name:  Jeanie Chan  5/9/1990  [id number]         Patient signature:  ___________________________________________________                   Statement of  Physician  My signature below affirms that prior to the time of the procedure, I have explained to the patient and/or his/her guardian, the risks and benefits involved in the proposed treatment and any reasonable alternative to the proposed treatment.  I have also explained the risks and benefits involved in the refusal of the proposed treatment and have answered the patient's questions.                        Date:  ______/______/_______  Provider                      Signature:  __________________________________________________________       Time:  ___________ A.M    P.M.

## (undated) NOTE — LETTER
AUTHORIZATION FOR SURGICAL OPERATION OR OTHER PROCEDURE    1. I hereby authorize Dr. Royal Arroyo and the [facility] staff assigned to my case to perform the following operation and/or procedure at the [facility]:     Bilateral hip injections with lidocaine under ultrasound guidance    2. My physician has explained the nature and purpose of the operation or other procedure, possible alternative methods of treatment, the risks involved, and the possibility of complication to me. I acknowledge that no guarantee has been made as to the result that may be obtained. 3.  I recognize that, during the course of this operation, or other procedure, unforseen conditions may necessitate additional or different procedure than those listed above. I, therefore, further authorize and request that the above named physician, his/her physician assistants or designees perform such procedures as are, in his/her professional opinion, necessary and desirable. 4.  Any tissue or organs removed in the operation or other procedure may be disposed of by and at the discretion of the [facility] staff and [facility]. 5.  I understand that in the event of a medical emergency, I will be transported by local paramedics to [facility] or other hospital emergency department. 6.  I certify that I have read and fully understand the above consent to operation and/or other procedure. 7.  I acknowledge that my physician has explained sedation/analgesia administration to me including the risks and benefits. I consent to the administration of sedation/analgesia as may be necessary or desirable in the judgement of my physician. Witness signature: ___________________________________________________ Date:  ______/______/_____                    Time:  ________ A. M.  P.M.        Patient Name:  Margoth Arana  5/9/1990  [id number]         Patient signature: ___________________________________________________               Statement of Physician  My signature below affirms that prior to the time of the procedure, I have explained to the patient and/or his/her guardian, the risks and benefits involved in the proposed treatment and any reasonable alternative to the proposed treatment. I have also explained the risks and benefits involved in the refusal of the proposed treatment and have answered the patient's questions.                         Date:  ______/______/_______  Provider                      Signature:  __________________________________________________________       Time:  ___________ AMARIANNE ROSE

## (undated) NOTE — LETTER
Dear new mom:    We've missed you! The nurses of [facility] have tried to reach you by phone to ask if you had any questions regarding your health or the care of your new little one.     Please feel free to call your doctor with

## (undated) NOTE — LETTER
AUTHORIZATION FOR SURGICAL OPERATION OR OTHER PROCEDURE    1. I hereby authorize Dr. Mike Rosa. Garret Keith, and [facility] staff assigned to my case to perform the following operation and/or procedure at the [facility]:    _______________________________________________________________________________________________    Upper lip shave biopsy   _______________________________________________________________________________________________    2. My physician has explained the nature and purpose of the operation or other procedure, possible alternative methods of treatment, the risks involved, and the possibility of complication to me. I acknowledge that no guarantee has been made as to the result that may be obtained. 3.  I recognize that, during the course of this operation, or other procedure, unforseen conditions may necessitate additional or different procedure than those listed above. I, therefore, further authorize and request that the above named physician, his/her physician assistants or designees perform such procedures as are, in his/her professional opinion, necessary and desirable. 4.  Any tissue or organs removed in the operation or other procedure may be disposed of by and at the discretion of the [facility] and [facility]. 5.  I understand that in the event of a medical emergency, I will be transported by local paramedics to [facility] or other hospital emergency department. 6.  I certify that I have read and fully understand the above consent to operation and/or other procedure. 7.  I acknowledge that my physician has explained sedation/analgesia administration to me including the risks and benefits. I consent to the administration of sedation/analgesia as may be necessary or desirable in the judgement of my physician.     Witness signature: ___________________________________________________ Date:  ______/______/_____                    Time: ________ A. M.  P.M. Patient Name:  ______________________________________________________  (please print)      Patient signature:  ___________________________________________________             Relationship to Patient:           []  Parent    Responsible person                          []  Spouse  In case of minor or                    [] Other  _____________   Incompetent name:  __________________________________________________                               (please print)      _____________      Responsible person  In case of minor or  Incompetent signature:  _______________________________________________    Statement of Physician  My signature below affirms that prior to the time of the procedure, I have explained to the patient and/or his/her guardian, the risks and benefits involved in the proposed treatment and any reasonable alternative to the proposed treatment. I have also explained the risks and benefits involved in the refusal of the proposed treatment and have answered the patient's questions.                         Date:  ______/______/_______  Provider                      Signature:  __________________________________________________________       Time:  ___________ A.M    P.M.

## (undated) NOTE — LETTER
21      Patient: Brandy Mayo  : 1990 Visit date: 2021    Dear Lennox Beady,      I examined your patient in consultation today. She has multiple lesions of the face which have been enlarging.  We will plan on surgica

## (undated) NOTE — LETTER
19      Patient: Vahid Gomes  : 1990 Visit date: 2019    Dear  Bee Messer,      I examined your patient in consultation today. She has multiple intradermal nevi of her face.   I stressed to her that excising any of these

## (undated) NOTE — LETTER
Date: 2024      Patient Name: Jeanie Chan      : 1990        Thank you for choosing [facility] as your health care provider. Your physician has deemed the following medical service(s) necessary. However, your insurance plan may not pay for all of your health care and costs and may deny payment for this service. The fact that your insurance plan does not pay for an item or service does not mean you should not receive it. The purpose of this form is to help you make an informed decision about whether or not you want to receive this service(s) that may not be paid for by your insurance plan.    CPT Code Description     Cost     _________ LEFT SUBACROMIAL CSI under US guidance  _____________            I understand that the above mentioned service(s) or supply may not be covered by my insurance company. I agree to be financially responsible for the cost of this service or supply in the event of my insurance denies payment as a non-covered benefit.        ______________________________________________________________________  Signature of Patient or Patient's Representative  Relationship  Date    ______________________________________________________________________  Signature of Witness to signing of form   Printed Name

## (undated) NOTE — Clinical Note
Please let patient know that I called in meloxicam and cyclobenzaprine for her TMJ issues. Meloxicam should not as having a possible interaction with one of her medications therefore she should be cautious for any GI discomfort. She should contact us if she notes any problems with the use of this medication.

## (undated) NOTE — Clinical Note
Please send physical therapy prescription over to [facility] in [facility].  Please also schedule for bilateral hip injections close to when she is in physical therapy.